# Patient Record
Sex: FEMALE | Race: BLACK OR AFRICAN AMERICAN
[De-identification: names, ages, dates, MRNs, and addresses within clinical notes are randomized per-mention and may not be internally consistent; named-entity substitution may affect disease eponyms.]

---

## 2018-11-12 ENCOUNTER — SURGERY (OUTPATIENT)
Age: 33
End: 2018-11-12

## 2018-11-12 ENCOUNTER — HOSPITAL ENCOUNTER (OUTPATIENT)
Facility: CLINIC | Age: 33
Setting detail: OBSERVATION
Discharge: HOME | End: 2018-11-13
Attending: EMERGENCY MEDICINE | Admitting: SURGERY
Payer: COMMERCIAL

## 2018-11-12 ENCOUNTER — ANESTHESIA EVENT (OUTPATIENT)
Dept: SURGERY | Facility: CLINIC | Age: 33
End: 2018-11-12
Payer: COMMERCIAL

## 2018-11-12 ENCOUNTER — APPOINTMENT (OUTPATIENT)
Dept: ULTRASOUND IMAGING | Facility: CLINIC | Age: 33
End: 2018-11-12
Attending: EMERGENCY MEDICINE
Payer: COMMERCIAL

## 2018-11-12 ENCOUNTER — ANESTHESIA (OUTPATIENT)
Dept: SURGERY | Facility: CLINIC | Age: 33
End: 2018-11-12
Payer: COMMERCIAL

## 2018-11-12 ENCOUNTER — APPOINTMENT (OUTPATIENT)
Dept: CT IMAGING | Facility: CLINIC | Age: 33
End: 2018-11-12
Attending: EMERGENCY MEDICINE
Payer: COMMERCIAL

## 2018-11-12 DIAGNOSIS — K35.80 ACUTE APPENDICITIS, UNSPECIFIED ACUTE APPENDICITIS TYPE: ICD-10-CM

## 2018-11-12 DIAGNOSIS — G89.18 ACUTE POST-OPERATIVE PAIN: Primary | ICD-10-CM

## 2018-11-12 LAB
ALBUMIN SERPL-MCNC: 4 G/DL (ref 3.4–5)
ALP SERPL-CCNC: 70 U/L (ref 40–150)
ALT SERPL W P-5'-P-CCNC: 20 U/L (ref 0–50)
ANION GAP SERPL CALCULATED.3IONS-SCNC: 8 MMOL/L (ref 3–14)
AST SERPL W P-5'-P-CCNC: 20 U/L (ref 0–45)
BASOPHILS # BLD AUTO: 0 10E9/L (ref 0–0.2)
BASOPHILS NFR BLD AUTO: 0.1 %
BILIRUB SERPL-MCNC: 0.6 MG/DL (ref 0.2–1.3)
BUN SERPL-MCNC: 9 MG/DL (ref 7–30)
CALCIUM SERPL-MCNC: 9.2 MG/DL (ref 8.5–10.1)
CHLORIDE SERPL-SCNC: 105 MMOL/L (ref 94–109)
CO2 SERPL-SCNC: 27 MMOL/L (ref 20–32)
CREAT SERPL-MCNC: 0.67 MG/DL (ref 0.52–1.04)
DIFFERENTIAL METHOD BLD: NORMAL
EOSINOPHIL # BLD AUTO: 0.1 10E9/L (ref 0–0.7)
EOSINOPHIL NFR BLD AUTO: 1 %
ERYTHROCYTE [DISTWIDTH] IN BLOOD BY AUTOMATED COUNT: 12.2 % (ref 10–15)
GFR SERPL CREATININE-BSD FRML MDRD: >90 ML/MIN/1.7M2
GLUCOSE SERPL-MCNC: 100 MG/DL (ref 70–99)
HCG UR QL: NEGATIVE
HCT VFR BLD AUTO: 41.7 % (ref 35–47)
HGB BLD-MCNC: 14.2 G/DL (ref 11.7–15.7)
IMM GRANULOCYTES # BLD: 0 10E9/L (ref 0–0.4)
IMM GRANULOCYTES NFR BLD: 0.2 %
INTERNAL QC OK POCT: YES
LIPASE SERPL-CCNC: 50 U/L (ref 73–393)
LYMPHOCYTES # BLD AUTO: 2.3 10E9/L (ref 0.8–5.3)
LYMPHOCYTES NFR BLD AUTO: 26.6 %
MCH RBC QN AUTO: 30.7 PG (ref 26.5–33)
MCHC RBC AUTO-ENTMCNC: 34.1 G/DL (ref 31.5–36.5)
MCV RBC AUTO: 90 FL (ref 78–100)
MONOCYTES # BLD AUTO: 0.4 10E9/L (ref 0–1.3)
MONOCYTES NFR BLD AUTO: 4.1 %
NEUTROPHILS # BLD AUTO: 6 10E9/L (ref 1.6–8.3)
NEUTROPHILS NFR BLD AUTO: 68 %
NRBC # BLD AUTO: 0 10*3/UL
NRBC BLD AUTO-RTO: 0 /100
PLATELET # BLD AUTO: 224 10E9/L (ref 150–450)
POTASSIUM SERPL-SCNC: 3.4 MMOL/L (ref 3.4–5.3)
PROT SERPL-MCNC: 8.1 G/DL (ref 6.8–8.8)
RBC # BLD AUTO: 4.63 10E12/L (ref 3.8–5.2)
SODIUM SERPL-SCNC: 140 MMOL/L (ref 133–144)
WBC # BLD AUTO: 8.8 10E9/L (ref 4–11)

## 2018-11-12 PROCEDURE — 40000170 ZZH STATISTIC PRE-PROCEDURE ASSESSMENT II: Performed by: SURGERY

## 2018-11-12 PROCEDURE — 99285 EMERGENCY DEPT VISIT HI MDM: CPT | Mod: 25 | Performed by: EMERGENCY MEDICINE

## 2018-11-12 PROCEDURE — 96366 THER/PROPH/DIAG IV INF ADDON: CPT | Performed by: EMERGENCY MEDICINE

## 2018-11-12 PROCEDURE — 27211024 ZZHC OR SUPPLY OTHER OPNP: Performed by: SURGERY

## 2018-11-12 PROCEDURE — 25000128 H RX IP 250 OP 636

## 2018-11-12 PROCEDURE — 85025 COMPLETE CBC W/AUTO DIFF WBC: CPT | Performed by: EMERGENCY MEDICINE

## 2018-11-12 PROCEDURE — 25000128 H RX IP 250 OP 636: Performed by: EMERGENCY MEDICINE

## 2018-11-12 PROCEDURE — 25000566 ZZH SEVOFLURANE, EA 15 MIN: Performed by: SURGERY

## 2018-11-12 PROCEDURE — 96376 TX/PRO/DX INJ SAME DRUG ADON: CPT | Mod: 59

## 2018-11-12 PROCEDURE — 36000057 ZZH SURGERY LEVEL 3 1ST 30 MIN - UMMC: Performed by: SURGERY

## 2018-11-12 PROCEDURE — 96365 THER/PROPH/DIAG IV INF INIT: CPT | Mod: 59 | Performed by: EMERGENCY MEDICINE

## 2018-11-12 PROCEDURE — 74177 CT ABD & PELVIS W/CONTRAST: CPT

## 2018-11-12 PROCEDURE — 25000128 H RX IP 250 OP 636: Performed by: STUDENT IN AN ORGANIZED HEALTH CARE EDUCATION/TRAINING PROGRAM

## 2018-11-12 PROCEDURE — 25000132 ZZH RX MED GY IP 250 OP 250 PS 637: Performed by: EMERGENCY MEDICINE

## 2018-11-12 PROCEDURE — 25000125 ZZHC RX 250

## 2018-11-12 PROCEDURE — 36000059 ZZH SURGERY LEVEL 3 EA 15 ADDTL MIN UMMC: Performed by: SURGERY

## 2018-11-12 PROCEDURE — 83690 ASSAY OF LIPASE: CPT | Performed by: EMERGENCY MEDICINE

## 2018-11-12 PROCEDURE — 27210794 ZZH OR GENERAL SUPPLY STERILE: Performed by: SURGERY

## 2018-11-12 PROCEDURE — 76705 ECHO EXAM OF ABDOMEN: CPT

## 2018-11-12 PROCEDURE — 80053 COMPREHEN METABOLIC PANEL: CPT | Performed by: EMERGENCY MEDICINE

## 2018-11-12 PROCEDURE — 96375 TX/PRO/DX INJ NEW DRUG ADDON: CPT | Mod: 59

## 2018-11-12 PROCEDURE — 37000008 ZZH ANESTHESIA TECHNICAL FEE, 1ST 30 MIN: Performed by: SURGERY

## 2018-11-12 PROCEDURE — 25000125 ZZHC RX 250: Performed by: EMERGENCY MEDICINE

## 2018-11-12 PROCEDURE — 71000015 ZZH RECOVERY PHASE 1 LEVEL 2 EA ADDTL HR: Performed by: SURGERY

## 2018-11-12 PROCEDURE — G0378 HOSPITAL OBSERVATION PER HR: HCPCS

## 2018-11-12 PROCEDURE — 96361 HYDRATE IV INFUSION ADD-ON: CPT | Mod: 59 | Performed by: EMERGENCY MEDICINE

## 2018-11-12 PROCEDURE — 71000014 ZZH RECOVERY PHASE 1 LEVEL 2 FIRST HR: Performed by: SURGERY

## 2018-11-12 PROCEDURE — 81025 URINE PREGNANCY TEST: CPT | Performed by: EMERGENCY MEDICINE

## 2018-11-12 PROCEDURE — 37000009 ZZH ANESTHESIA TECHNICAL FEE, EACH ADDTL 15 MIN: Performed by: SURGERY

## 2018-11-12 RX ORDER — NALOXONE HYDROCHLORIDE 0.4 MG/ML
.1-.4 INJECTION, SOLUTION INTRAMUSCULAR; INTRAVENOUS; SUBCUTANEOUS
Status: DISCONTINUED | OUTPATIENT
Start: 2018-11-12 | End: 2018-11-13

## 2018-11-12 RX ORDER — CEFAZOLIN SODIUM 2 G/100ML
2 INJECTION, SOLUTION INTRAVENOUS
Status: COMPLETED | OUTPATIENT
Start: 2018-11-12 | End: 2018-11-12

## 2018-11-12 RX ORDER — IOPAMIDOL 755 MG/ML
100 INJECTION, SOLUTION INTRAVASCULAR ONCE
Status: COMPLETED | OUTPATIENT
Start: 2018-11-12 | End: 2018-11-12

## 2018-11-12 RX ORDER — HYDROMORPHONE HYDROCHLORIDE 1 MG/ML
0.5 INJECTION, SOLUTION INTRAMUSCULAR; INTRAVENOUS; SUBCUTANEOUS
Status: COMPLETED | OUTPATIENT
Start: 2018-11-12 | End: 2018-11-12

## 2018-11-12 RX ORDER — ONDANSETRON 2 MG/ML
4 INJECTION INTRAMUSCULAR; INTRAVENOUS EVERY 6 HOURS PRN
Status: DISCONTINUED | OUTPATIENT
Start: 2018-11-12 | End: 2018-11-13

## 2018-11-12 RX ORDER — OXYCODONE HYDROCHLORIDE 5 MG/1
5-10 TABLET ORAL
Status: DISCONTINUED | OUTPATIENT
Start: 2018-11-12 | End: 2018-11-13 | Stop reason: DRUGHIGH

## 2018-11-12 RX ORDER — HYDROMORPHONE HCL/0.9% NACL/PF 0.2MG/0.2
0.2 SYRINGE (ML) INTRAVENOUS
Status: DISCONTINUED | OUTPATIENT
Start: 2018-11-12 | End: 2018-11-13 | Stop reason: DRUGHIGH

## 2018-11-12 RX ORDER — PROPOFOL 10 MG/ML
INJECTION, EMULSION INTRAVENOUS PRN
Status: DISCONTINUED | OUTPATIENT
Start: 2018-11-12 | End: 2018-11-13

## 2018-11-12 RX ORDER — ONDANSETRON 2 MG/ML
4 INJECTION INTRAMUSCULAR; INTRAVENOUS ONCE
Status: COMPLETED | OUTPATIENT
Start: 2018-11-12 | End: 2018-11-12

## 2018-11-12 RX ORDER — HYDROMORPHONE HYDROCHLORIDE 1 MG/ML
0.5 INJECTION, SOLUTION INTRAMUSCULAR; INTRAVENOUS; SUBCUTANEOUS ONCE
Status: COMPLETED | OUTPATIENT
Start: 2018-11-12 | End: 2018-11-12

## 2018-11-12 RX ORDER — LIDOCAINE 40 MG/G
CREAM TOPICAL
Status: DISCONTINUED | OUTPATIENT
Start: 2018-11-12 | End: 2018-11-13 | Stop reason: HOSPADM

## 2018-11-12 RX ORDER — FENTANYL CITRATE 50 UG/ML
INJECTION, SOLUTION INTRAMUSCULAR; INTRAVENOUS PRN
Status: DISCONTINUED | OUTPATIENT
Start: 2018-11-12 | End: 2018-11-13

## 2018-11-12 RX ORDER — ONDANSETRON 4 MG/1
4 TABLET, ORALLY DISINTEGRATING ORAL EVERY 6 HOURS PRN
Status: DISCONTINUED | OUTPATIENT
Start: 2018-11-12 | End: 2018-11-13

## 2018-11-12 RX ORDER — DEXAMETHASONE SODIUM PHOSPHATE 4 MG/ML
INJECTION, SOLUTION INTRA-ARTICULAR; INTRALESIONAL; INTRAMUSCULAR; INTRAVENOUS; SOFT TISSUE PRN
Status: DISCONTINUED | OUTPATIENT
Start: 2018-11-12 | End: 2018-11-13

## 2018-11-12 RX ORDER — CEFAZOLIN SODIUM 1 G/3ML
1 INJECTION, POWDER, FOR SOLUTION INTRAMUSCULAR; INTRAVENOUS SEE ADMIN INSTRUCTIONS
Status: DISCONTINUED | OUTPATIENT
Start: 2018-11-12 | End: 2018-11-12

## 2018-11-12 RX ORDER — ACETAMINOPHEN 325 MG/1
650 TABLET ORAL EVERY 4 HOURS PRN
Status: DISCONTINUED | OUTPATIENT
Start: 2018-11-12 | End: 2018-11-13 | Stop reason: DRUGHIGH

## 2018-11-12 RX ORDER — SODIUM CHLORIDE 9 MG/ML
1000 INJECTION, SOLUTION INTRAVENOUS CONTINUOUS
Status: DISCONTINUED | OUTPATIENT
Start: 2018-11-12 | End: 2018-11-13 | Stop reason: DRUGHIGH

## 2018-11-12 RX ORDER — CEFAZOLIN SODIUM 1 G/3ML
1 INJECTION, POWDER, FOR SOLUTION INTRAMUSCULAR; INTRAVENOUS SEE ADMIN INSTRUCTIONS
Status: DISCONTINUED | OUTPATIENT
Start: 2018-11-12 | End: 2018-11-13 | Stop reason: HOSPADM

## 2018-11-12 RX ORDER — ERTAPENEM 1 G/1
1 INJECTION, POWDER, LYOPHILIZED, FOR SOLUTION INTRAMUSCULAR; INTRAVENOUS ONCE
Status: COMPLETED | OUTPATIENT
Start: 2018-11-12 | End: 2018-11-12

## 2018-11-12 RX ORDER — SODIUM CHLORIDE, SODIUM LACTATE, POTASSIUM CHLORIDE, CALCIUM CHLORIDE 600; 310; 30; 20 MG/100ML; MG/100ML; MG/100ML; MG/100ML
INJECTION, SOLUTION INTRAVENOUS CONTINUOUS
Status: DISCONTINUED | OUTPATIENT
Start: 2018-11-12 | End: 2018-11-13

## 2018-11-12 RX ORDER — CEFAZOLIN SODIUM 2 G/100ML
2 INJECTION, SOLUTION INTRAVENOUS
Status: DISCONTINUED | OUTPATIENT
Start: 2018-11-12 | End: 2018-11-12 | Stop reason: HOSPADM

## 2018-11-12 RX ADMIN — DEXAMETHASONE SODIUM PHOSPHATE 6 MG: 4 INJECTION, SOLUTION INTRA-ARTICULAR; INTRALESIONAL; INTRAMUSCULAR; INTRAVENOUS; SOFT TISSUE at 23:52

## 2018-11-12 RX ADMIN — ONDANSETRON 4 MG: 2 INJECTION INTRAMUSCULAR; INTRAVENOUS at 21:17

## 2018-11-12 RX ADMIN — ROCURONIUM BROMIDE 40 MG: 10 INJECTION INTRAVENOUS at 23:45

## 2018-11-12 RX ADMIN — SODIUM CHLORIDE, POTASSIUM CHLORIDE, SODIUM LACTATE AND CALCIUM CHLORIDE: 600; 310; 30; 20 INJECTION, SOLUTION INTRAVENOUS at 23:19

## 2018-11-12 RX ADMIN — FENTANYL CITRATE 50 MCG: 50 INJECTION, SOLUTION INTRAMUSCULAR; INTRAVENOUS at 23:31

## 2018-11-12 RX ADMIN — SODIUM CHLORIDE 1000 ML: 9 INJECTION, SOLUTION INTRAVENOUS at 12:09

## 2018-11-12 RX ADMIN — SODIUM CHLORIDE 1000 ML: 9 INJECTION, SOLUTION INTRAVENOUS at 14:23

## 2018-11-12 RX ADMIN — PROPOFOL 100 MG: 10 INJECTION, EMULSION INTRAVENOUS at 23:31

## 2018-11-12 RX ADMIN — SODIUM CHLORIDE 59 ML: 9 INJECTION, SOLUTION INTRAVENOUS at 16:27

## 2018-11-12 RX ADMIN — Medication 0.5 MG: at 18:09

## 2018-11-12 RX ADMIN — CEFAZOLIN SODIUM 2 G: 2 INJECTION, SOLUTION INTRAVENOUS at 23:50

## 2018-11-12 RX ADMIN — SODIUM CHLORIDE 1000 ML: 9 INJECTION, SOLUTION INTRAVENOUS at 18:08

## 2018-11-12 RX ADMIN — SODIUM CHLORIDE, POTASSIUM CHLORIDE, SODIUM LACTATE AND CALCIUM CHLORIDE: 600; 310; 30; 20 INJECTION, SOLUTION INTRAVENOUS at 21:10

## 2018-11-12 RX ADMIN — LIDOCAINE HYDROCHLORIDE 100 MG: 20 INJECTION, SOLUTION INFILTRATION; PERINEURAL at 23:31

## 2018-11-12 RX ADMIN — FAMOTIDINE 20 MG: 10 INJECTION, SOLUTION INTRAVENOUS at 12:13

## 2018-11-12 RX ADMIN — Medication 0.2 MG: at 21:11

## 2018-11-12 RX ADMIN — ONDANSETRON 4 MG: 2 INJECTION INTRAMUSCULAR; INTRAVENOUS at 14:24

## 2018-11-12 RX ADMIN — ONDANSETRON 4 MG: 2 INJECTION INTRAMUSCULAR; INTRAVENOUS at 12:09

## 2018-11-12 RX ADMIN — ERTAPENEM SODIUM 1 G: 1 INJECTION, POWDER, LYOPHILIZED, FOR SOLUTION INTRAMUSCULAR; INTRAVENOUS at 17:44

## 2018-11-12 RX ADMIN — HYDROMORPHONE HYDROCHLORIDE 0.5 MG: 1 INJECTION, SOLUTION INTRAMUSCULAR; INTRAVENOUS; SUBCUTANEOUS at 14:20

## 2018-11-12 RX ADMIN — Medication 100 MG: at 23:33

## 2018-11-12 RX ADMIN — MIDAZOLAM 1 MG: 1 INJECTION INTRAMUSCULAR; INTRAVENOUS at 23:22

## 2018-11-12 RX ADMIN — IOPAMIDOL 78 ML: 755 INJECTION, SOLUTION INTRAVENOUS at 16:26

## 2018-11-12 RX ADMIN — LIDOCAINE HYDROCHLORIDE 30 ML: 20 SOLUTION ORAL; TOPICAL at 12:19

## 2018-11-12 ASSESSMENT — ENCOUNTER SYMPTOMS
FEVER: 0
ABDOMINAL PAIN: 1
VOMITING: 1
COUGH: 0
DIARRHEA: 0
SHORTNESS OF BREATH: 0
DYSURIA: 1

## 2018-11-12 NOTE — IP AVS SNAPSHOT
[address] [address]    Phone:  [phone]                                       After Visit Summary   11/12/2018    Poppy Bustillo    MRN: [id number]           After Visit Summary Signature Page     I have received my discharge instructions, and my questions have been answered. I have discussed any challenges I see with this plan with the nurse or doctor.    ..........................................................................................................................................  Patient/Patient Representative Signature      ..........................................................................................................................................  Patient Representative Print Name and Relationship to Patient    ..................................................               ................................................  Date                                   Time    ..........................................................................................................................................  Reviewed by Signature/Title    ...................................................              ..............................................  Date                                               Time          22EPIC Rev 08/18

## 2018-11-12 NOTE — ED PROVIDER NOTES
History     Chief Complaint   Patient presents with     Abdominal Pain     epigastric pain since yesterday, emesis x 2 today     The history is provided by the patient.     Poppy Bustillo is an otherwise healthy 33 year old female who presents to the Emergency Department due to epigastric pain. Patient reports that she began having epigastric pain yesterday (11/11). She notes that the pain has been constant since onset and states that it kept her from sleeping. She describes the pain as squeezing and reports that she had similar symptoms 2-3 months ago which resolved on its own. She notes that the pain worsens when she lies flat or has something to eat. This morning, patient had four episodes of non-bloody emesis. She denies fevers, cough, chest pain or shortness of breath. Her last normal bowel movement was this morning and she denies diarrhea or bloody stools; no urinary symptoms. Patient denies the use of alcohol and she does not smoke. She took an over-the-counter medication for bloating for symptom management without relief. Denies regular NSAID use.  LMP was on November 4. She denies previous abdominal surgeries.      Current Facility-Administered Medications   Medication     sodium chloride 0.9% infusion     No current outpatient prescriptions on file.     Past Medical History:   Diagnosis Date     NO ACTIVE PROBLEMS        History reviewed. No pertinent surgical history.    No family history on file.    Social History   Substance Use Topics     Smoking status: Never Smoker     Smokeless tobacco: Never Used     Alcohol use No     No Known Allergies    I have reviewed the Medications, Allergies, Past Medical and Surgical History, and Social History in the Epic system.    Review of Systems   Constitutional: Negative for fever.   Respiratory: Negative for cough and shortness of breath.    Cardiovascular: Negative for chest pain.   Gastrointestinal: Positive for abdominal pain (epigastric pain) and vomiting  (non-bloody). Negative for diarrhea.   Genitourinary: Positive for dysuria.   All other systems reviewed and are negative.      Physical Exam   BP: 125/82  Pulse: 85  Temp: 98.1  F (36.7  C)  Resp: 16  Weight: 72.3 kg (159 lb 8 oz)  SpO2: 99 %      Physical Exam   General: patient is alert and oriented, uncomfortable appearing  Head: atraumatic and normocephalic   EENT: moist mucus membranes, pupils round and reactive, sclera anicteric  Neck: supple   Cardiovascular: regular rate and rhythm, extremities warm and well perfused, no lower extremity edema  Pulmonary: lungs clear to auscultation bilaterally   Abdomen: soft, diffusely TTP without guarding, most significant in the epigastrum and LUQ, no CVA TTP   Musculoskeletal: normal range of motion   Neurological: alert and oriented, moving all extremities symmetrically, gait normal   Skin: warm, dry       ED Course   11:42 AM  The patient was seen and examined by Elaine Merida MD in Room 6.   ED Course     Procedures             Critical Care time:  none             Labs Ordered and Resulted from Time of ED Arrival Up to the Time of Departure from the ED - No data to display         Assessments & Plan (with Medical Decision Making)   Patient is a 33-year-old female who presents with epigastric pain. On chart review, she has multiple recurrent episodes of epigastric pain and is currently scheduled for an EGD on Wednesday. She reports that her pain became worse this morning and resulted in multiple episodes of emesis. She is hemodynamically stable and afebrile. She does have tenderness to palpation in the epigastrium and also diffusely in the abdomen. Differential diagnosis includes but is not limited to biliary colic, gastritis, peptic ulcer disease, pancreatitis, hepatitis, appendicitis. She reports she is having regular bowel movements and had one this morning. Low suspicion for bowel obstruction. She denies any melena, hematochezia or hematemesis to suggest a GI  bleed. She has previously tested negative for H. pylori. Labs were obtained including CBC, CMP, lipase and urine pregnancy which are WNL.  Ultrasound of the right upper quadrant is unremarkable. She was given IV fluids, Zofran, famotidine and a GI cocktail. On reassessment, she reports worsening pain with ongoing vomiting.  Patient given additional zofran and dose of hydromorphone.  CT abdomen ordered and pending at sign out.  If CT negative and patient tolerating PO, anticipate discharge to home with plan to follow-up this week for outpatient EGD.      I have reviewed the nursing notes.    I have reviewed the findings, diagnosis, plan and need for follow up with the patient.    New Prescriptions    No medications on file       Final diagnoses:   Abdominal pain, generalized   I, Yelena Silveira, am serving as a trained medical scribe to document services personally performed by Elaine Merida MD, based on the provider's statements to me.   IElaine MD, was physically present and have reviewed and verified the accuracy of this note documented by Yelena Silveira.    11/12/2018   [facility], [place], EMERGENCY DEPARTMENT     Elaine Merida MD  11/12/18 4007

## 2018-11-12 NOTE — DISCHARGE INSTRUCTIONS
Please make an appointment to follow up with Your Primary Care Provider in 3-5 days and follow-up for outpatient EGD as scheduled.  Take ranitidine 150 mg twice a day to help with symptoms.  If you have worsening pain, intractable vomiting, fevers, or other concerns, return to the emergency department for re-evaluation.        Abdominal Pain    Abdominal pain is pain in the stomach or belly area. Everyone has this pain from time to time. In many cases it goes away on its own. But abdominal pain can sometimes be due to a serious problem, such as appendicitis. So it s important to know when to seek help.  Causes of abdominal pain  There are many possible causes of abdominal pain. Common causes in adults include:    Constipation, diarrhea, or gas    Stomach acid flowing back up into the esophagus (acid reflux or heartburn)    Severe acid reflux, called GERD (gastroesophageal reflux disease)    A sore in the lining of the stomach or small intestine (peptic ulcer)    Inflammation of the gallbladder, liver, or pancreas    Gallstones or kidney stones    Appendicitis     Intestinal blockage     An internal organ pushing through a muscle or other tissue (hernia)    Urinary tract infections    In women, menstrual cramps, fibroids, or endometriosis    Inflammation or infection of the intestines  Diagnosing the cause of abdominal pain  Your healthcare provider will do a physical exam help find the cause of your pain. If needed, tests will be ordered. Belly pain has many possible causes. So it can be hard to find the reason for your pain. Giving details about your pain can help. Tell your provider where and when you feel the pain, and what makes it better or worse. Also let your provider know if you have other symptoms such as:    Fever    Tiredness    Upset stomach (nausea)    Vomiting    Changes in bathroom habits  Treating abdominal pain  Some causes of pain need emergency medical treatment right away. These include  appendicitis or a bowel blockage. Other problems can be treated with rest, fluids, or medicines. Your healthcare provider can give you specific instructions for treatment or self-care based on what is causing your pain.  If you have vomiting or diarrhea, sip water or other clear fluids. When you are ready to eat solid foods again, start with small amounts of easy-to-digest, low-fat foods. These include apple sauce, toast, or crackers.   When to seek medical care  Call 911 or go to the hospital right away if you:    Can t pass stool and are vomiting    Are vomiting blood or have bloody diarrhea or black, tarry diarrhea    Have chest, neck, or shoulder pain    Feel like you might pass out    Have pain in your shoulder blades with nausea    Have sudden, severe belly pain    Have new, severe pain unlike any you have felt before    Have a belly that is rigid, hard, and tender to touch  Call your healthcare provider if you have:    Pain for more than 5 days    Bloating for more than 2 days    Diarrhea for more than 5 days    A fever of 100.4 F (38 C) or higher, or as directed by your healthcare provider    Pain that gets worse    Weight loss for no reason    Continued lack of appetite    Blood in your stool  How to prevent abdominal pain  Here are some tips to help prevent abdominal pain:    Eat smaller amounts of food at one time.    Avoid greasy, fried, or other high-fat foods.    Avoid foods that give you gas.    Exercise regularly.    Drink plenty of fluids.  To help prevent GERD symptoms:    Quit smoking.    Reduce alcohol and certain foods that increase stomach acid.    Avoid aspirin and over-the-counter pain and fever medicines (NSAIDS or nonsteroidal anti-inflammatory drugs), if possible    Lose extra weight.    Finish eating at least 2 hours before you go to bed or lie down.    Raise the head of your bed.  Date Last Reviewed: 7/1/2016 2000-2018 [employer]. [address] [facility], [place]  [address]. All rights reserved. This information is not intended as a substitute for professional medical care. Always follow your healthcare professional's instructions.

## 2018-11-12 NOTE — ED NOTES
Patient received as sign out at change of shift pending abdominal CT.  The patient's CT scan reveals an acute appendicitis.  Patient was reexamined at 1650 hrs.  She continues to complain of upper abdominal pain.  She does have some tenderness to palpation in the right lower quadrant of her abdomen but no peritoneal signs at this time.  Ertapenem 1 g IV was ordered.  The patient was discussed with Dr. Silverman of general surgery.  A bed was requested at the [facility].  The patient will be admitted for further management of acute appendicitis.    Results for orders placed or performed during the hospital encounter of 11/12/18 (from the past 24 hour(s))   CBC with platelets differential   Result Value Ref Range    WBC 8.8 4.0 - 11.0 10e9/L    RBC Count 4.63 3.8 - 5.2 10e12/L    Hemoglobin 14.2 11.7 - 15.7 g/dL    Hematocrit 41.7 35.0 - 47.0 %    MCV 90 78 - 100 fl    MCH 30.7 26.5 - 33.0 pg    MCHC 34.1 31.5 - 36.5 g/dL    RDW 12.2 10.0 - 15.0 %    Platelet Count 224 150 - 450 10e9/L    Diff Method Automated Method     % Neutrophils 68.0 %    % Lymphocytes 26.6 %    % Monocytes 4.1 %    % Eosinophils 1.0 %    % Basophils 0.1 %    % Immature Granulocytes 0.2 %    Nucleated RBCs 0 0 /100    Absolute Neutrophil 6.0 1.6 - 8.3 10e9/L    Absolute Lymphocytes 2.3 0.8 - 5.3 10e9/L    Absolute Monocytes 0.4 0.0 - 1.3 10e9/L    Absolute Eosinophils 0.1 0.0 - 0.7 10e9/L    Absolute Basophils 0.0 0.0 - 0.2 10e9/L    Abs Immature Granulocytes 0.0 0 - 0.4 10e9/L    Absolute Nucleated RBC 0.0    Comprehensive metabolic panel   Result Value Ref Range    Sodium 140 133 - 144 mmol/L    Potassium 3.4 3.4 - 5.3 mmol/L    Chloride 105 94 - 109 mmol/L    Carbon Dioxide 27 20 - 32 mmol/L    Anion Gap 8 3 - 14 mmol/L    Glucose 100 (H) 70 - 99 mg/dL    Urea Nitrogen 9 7 - 30 mg/dL    Creatinine 0.67 0.52 - 1.04 mg/dL    GFR Estimate >90 >60 mL/min/1.7m2    GFR Estimate If Black >90 >60 mL/min/1.7m2    Calcium 9.2 8.5 - 10.1 mg/dL     Bilirubin Total 0.6 0.2 - 1.3 mg/dL    Albumin 4.0 3.4 - 5.0 g/dL    Protein Total 8.1 6.8 - 8.8 g/dL    Alkaline Phosphatase 70 40 - 150 U/L    ALT 20 0 - 50 U/L    AST 20 0 - 45 U/L   Lipase   Result Value Ref Range    Lipase 50 (L) 73 - 393 U/L   US Abdomen Limited (RUQ)    Narrative    RIGHT UPPER QUADRANT ULTRASOUND    PROCEDURE DATE:  11/12/2018 1:34 PM     CLINICAL HISTORY/INDICATION:   epigastric pain and vomiting;     COMPARISON:   None.    TECHNIQUE:   Grayscale and color doppler were performed of the right upper  quadrant.    FINDINGS:   Gallbladder:  Normal with no cholelithiasis, wall thickening or focal  tenderness.      Bile ducts:   CHD is normal diameter.  No intrahepatic biliary  dilatation.    Liver:   Normal.     Pancreas:  Unremarkable head and body, the tail is partially obscured  by bowel gas.    Right kidney:  Measures 9.5 cm. Cortical echogenicity is normal without evidence for  shadowing stone or mass. No renal cysts. No hydronephrosis.       Impression    IMPRESSION:   Unremarkable right upper quadrant ultrasound    KWABENA GERARDO MD   hCG qual urine POCT   Result Value Ref Range    HCG Qual Urine Negative neg    Internal QC OK Yes    CT Abdomen Pelvis w Contrast    Narrative    CT ABDOMEN AND PELVIS WITH CONTRAST   11/12/2018 4:28 PM     HISTORY: Abdominal pain, intractable with vomiting.    TECHNIQUE: 78mL of isovue 370. CT images of the abdomen and pelvis  following nonionic intravenous contrast. Radiation dose for this scan  was reduced using automated exposure control, adjustment of the mA  and/or kV according to patient size, or iterative reconstruction  technique.    COMPARISON: None.    FINDINGS:   The appendix is abnormally dilated at 1.6 cm and surrounded by fatty  infiltration. There is a large appendicolith. There is some  fecalization of the distal small bowel contents but no evidence of  abscess. There is trace free fluid in the pelvis.    The liver, gallbladder, spleen,  pancreas, adrenal glands, and kidneys  appear normal. No free intraperitoneal air. No bowel obstruction. No  abdominal or retroperitoneal lymphadenopathy. No pelvic adenopathy.  Visualized bones are unremarkable.      Impression    IMPRESSION: Acute appendicitis.    Results discussed with Dr. Gongora at 4:38 PM on 11/12/2018.    MD Rolan SOUSA Paul, MD  11/12/18 1633

## 2018-11-12 NOTE — LETTER
[address] [facility]  [address] [facility] [address]  Phone: [phone]    November 13, 2018        Poppy Bustillo  [address]   [facility] [address]          To whom it may concern:    RE: Poppy Bustillo was admitted to the hospital on 11/12/2018 and discharged on 11/13/2018. Please excuse her from any work obligations she may have missed at that time. Patient should not return to work until 11/19/18 for her recovery. At that time, she should remain on light duty for 2 weeks and should not lift more than 15 pounds.    Please contact me for questions or concerns.      Sincerely,        Jaxson Jordan MD  General Surgery   [phone]

## 2018-11-12 NOTE — IP AVS SNAPSHOT
MRN:[id number]                      After Visit Summary   11/12/2018    Poppy Bustillo    MRN: [id number]           Thank you!     Thank you for choosing [facility] for your care. Our goal is always to provide you with excellent care. Hearing back from our patients is one way we can continue to improve our services. Please take a few minutes to complete the written survey that you may receive in the mail after you visit with us. Thank you!        Patient Information     Date Of Birth          1985        About your hospital stay     You were admitted on:  November 12, 2018 You last received care in the:  [facility]    You were discharged on:  November 13, 2018        Reason for your hospital stay       Acute appendicitis                  Who to Call     For medical emergencies, please call 911.  For non-urgent questions about your medical care, please call your primary care provider or clinic, None  For questions related to your surgery, please call your surgery clinic        Attending Provider     Provider Specialty    Elaine Merida MD Emergency Medicine Emergency Medical Services    Wilfredo Gongora MD Emergency Medicine    Herrera, Lynnette Rios MD General Surgery       Primary Care Provider Fax #    Physician No Ref-Primary [phone]      After Care Instructions     Activity       Your activity upon discharge: no heavy lifting for 2-3 weeks, do not drive while taking narcotic pain medications, activity as tolerated.            Diet       Follow this diet upon discharge: Regular            Discharge Instructions       May start general diet immediately.    Do not lift more than 20 pounds for 4 weeks after surgery.     You may shower after surgery but do not scrub incisions; simply let soapy water run over them. Pat area dry.     If you have steri-strips or glue over the incisions, these items will fall off on their own and do not need to be replaced.    Do not  soak in a bath tub or pool for 4 weeks after surgery.    No driving, no using heavy machinery and no major decision-making while taking narcotic pain medication. It is illegal to drive while taking narcotic pain medication. Narcotics can cause constipation. Take stool softener while taking narcotic pain medication. If no bowel movement for 2 days add a laxative to aid in bowel movement. You can obtain a laxative from your pharmacyst over the counter.     You may take Tylenol (acetaminophen) and/or Motrin (ibuprofen) in addition or instead of narcotic medication; it is helpful to take these medications as you wean down off of the narcotic medications. If you have been prescribed Percocet, be aware that it contains Tylenol and oxycodone together. Do not take a total of more than 3500 mg of Tylenol per 24 hours to avoid liver damage.    Please call if you experience increasing abdominal pain, nausea, vomiting, increasing drainage from your wounds, chills, or fever >101.5.    If you have questions about your follow-up appointment, please call the General Surgery Clinic at [phone].  Call [phone] and ask to speak with surgery resident if you are having troubles in the evenings, at night, or on weekends.                  Further instructions from your care team       Please make an appointment to follow up with Your Primary Care Provider in 3-5 days and follow-up for outpatient EGD as scheduled.  Take ranitidine 150 mg twice a day to help with symptoms.  If you have worsening pain, intractable vomiting, fevers, or other concerns, return to the emergency department for re-evaluation.        Abdominal Pain    Abdominal pain is pain in the stomach or belly area. Everyone has this pain from time to time. In many cases it goes away on its own. But abdominal pain can sometimes be due to a serious problem, such as appendicitis. So it s important to know when to seek help.  Causes of abdominal pain  There are many  possible causes of abdominal pain. Common causes in adults include:    Constipation, diarrhea, or gas    Stomach acid flowing back up into the esophagus (acid reflux or heartburn)    Severe acid reflux, called GERD (gastroesophageal reflux disease)    A sore in the lining of the stomach or small intestine (peptic ulcer)    Inflammation of the gallbladder, liver, or pancreas    Gallstones or kidney stones    Appendicitis     Intestinal blockage     An internal organ pushing through a muscle or other tissue (hernia)    Urinary tract infections    In women, menstrual cramps, fibroids, or endometriosis    Inflammation or infection of the intestines  Diagnosing the cause of abdominal pain  Your healthcare provider will do a physical exam help find the cause of your pain. If needed, tests will be ordered. Belly pain has many possible causes. So it can be hard to find the reason for your pain. Giving details about your pain can help. Tell your provider where and when you feel the pain, and what makes it better or worse. Also let your provider know if you have other symptoms such as:    Fever    Tiredness    Upset stomach (nausea)    Vomiting    Changes in bathroom habits  Treating abdominal pain  Some causes of pain need emergency medical treatment right away. These include appendicitis or a bowel blockage. Other problems can be treated with rest, fluids, or medicines. Your healthcare provider can give you specific instructions for treatment or self-care based on what is causing your pain.  If you have vomiting or diarrhea, sip water or other clear fluids. When you are ready to eat solid foods again, start with small amounts of easy-to-digest, low-fat foods. These include apple sauce, toast, or crackers.   When to seek medical care  Call 911 or go to the hospital right away if you:    Can t pass stool and are vomiting    Are vomiting blood or have bloody diarrhea or black, tarry diarrhea    Have chest, neck, or shoulder  pain    Feel like you might pass out    Have pain in your shoulder blades with nausea    Have sudden, severe belly pain    Have new, severe pain unlike any you have felt before    Have a belly that is rigid, hard, and tender to touch  Call your healthcare provider if you have:    Pain for more than 5 days    Bloating for more than 2 days    Diarrhea for more than 5 days    A fever of 100.4 F (38 C) or higher, or as directed by your healthcare provider    Pain that gets worse    Weight loss for no reason    Continued lack of appetite    Blood in your stool  How to prevent abdominal pain  Here are some tips to help prevent abdominal pain:    Eat smaller amounts of food at one time.    Avoid greasy, fried, or other high-fat foods.    Avoid foods that give you gas.    Exercise regularly.    Drink plenty of fluids.  To help prevent GERD symptoms:    Quit smoking.    Reduce alcohol and certain foods that increase stomach acid.    Avoid aspirin and over-the-counter pain and fever medicines (NSAIDS or nonsteroidal anti-inflammatory drugs), if possible    Lose extra weight.    Finish eating at least 2 hours before you go to bed or lie down.    Raise the head of your bed.  Date Last Reviewed: 7/1/2016 2000-2018 [employer]. [address] [place] [address]. All rights reserved. This information is not intended as a substitute for professional medical care. Always follow your healthcare professional's instructions.            Additional Information     If you use hormonal birth control (such as the pill, patch, ring or implants): You'll need a second form of birth control for 7 days (condoms, a diaphragm or contraceptive foam). While in the hospital, you received a medicine called Bridion. Your normal birth control will not work as well for a week after taking this medicine.          Pending Results     Date and Time Order Name Status Description    11/13/2018 0048 Surgical pathology exam In process   "           Statement of Approval     Ordered          18 1023  I have reviewed and agree with all the recommendations and orders detailed in this document.  EFFECTIVE NOW     Approved and electronically signed by:  Jaxson Jordan MD             Admission Information     Date & Time Provider Department Dept. Phone    2018 Lynnette Silverman MD [address] [facility] [place] [phone]      Your Vitals Were     Blood Pressure Pulse Temperature Respirations Weight Last Period    101/46 (BP Location: Right arm) 85 98.2  F (36.8  C) (Oral) 16 72.3 kg (159 lb 8 oz) 2018    Pulse Oximetry                   97%           MyChart Information     [employer] lets you send messages to your doctor, view your test results, renew your prescriptions, schedule appointments and more. To sign up, go to [email or web address] . Click on \"Log in\" on the left side of the screen, which will take you to the Welcome page. Then click on \"Sign up Now\" on the right side of the page.     You will be asked to enter the access code listed below, as well as some personal information. Please follow the directions to create your username and password.     Your access code is: [email or web address]  Expires: 2019 11:10 AM     Your access code will  in 90 days. If you need help or a new code, please call your [facility] clinic or [phone].        Care EveryWhere ID     This is your Care EveryWhere ID. This could be used by other organizations to access your [facility] medical records  [id number]        Equal Access to Services     [facility]: Hadii robert umanzoro Sojaxon, waaxda luqadaha, qaybta kaalmada mat mark . So [facility] [phone].    ATENCIÓN: Si habla español, tiene a villalta disposición servicios gratuitos de asistencia lingüística. Llame al [phone].    We comply with applicable federal civil rights laws and [place] laws. We do not discriminate on the basis of race, " color, national origin, age, disability, sex, sexual orientation, or gender identity.               Review of your medicines      START taking        Dose / Directions    acetaminophen 325 MG tablet   Commonly known as:  TYLENOL   Used for:  Acute post-operative pain        Dose:  650 mg   Take 2 tablets (650 mg) by mouth every 6 hours as needed for mild pain   Quantity:  100 tablet   Refills:  0       oxyCODONE IR 5 MG tablet   Commonly known as:  ROXICODONE        Dose:  5-10 mg   Take 1-2 tablets (5-10 mg) by mouth every 6 hours as needed (Moderate to Severe Pain)   Quantity:  16 tablet   Refills:  0            Where to get your medicines      These medications were sent to [facility] [place], [place] - [address]  [address] [address]     Phone:  [phone]     acetaminophen 325 MG tablet         Some of these will need a paper prescription and others can be bought over the counter. Ask your nurse if you have questions.     Bring a paper prescription for each of these medications     oxyCODONE IR 5 MG tablet                Protect others around you: Learn how to safely use, store and throw away your medicines at www.disposemymeds.org.        Information about OPIOIDS     PRESCRIPTION OPIOIDS: WHAT YOU NEED TO KNOW   We gave you an opioid (narcotic) pain medicine. It is important to manage your pain, but opioids are not always the best choice. You should first try all the other options your care team gave you. Take this medicine for as short a time (and as few doses) as possible.    Some activities can increase your pain, such as bandage changes or therapy sessions. It may help to take your pain medicine 30 to 60 minutes before these activities. Reduce your stress by getting enough sleep, working on hobbies you enjoy and practicing relaxation or meditation. Talk to your care team about ways to manage your pain beyond prescription opioids.    These medicines have  risks:    DO NOT drive when on new or higher doses of pain medicine. These medicines can affect your alertness and reaction times, and you could be arrested for driving under the influence (DUI). If you need to use opioids long-term, talk to your care team about driving.    DO NOT operate heavy machinery    DO NOT do any other dangerous activities while taking these medicines.    DO NOT drink any alcohol while taking these medicines.     If the opioid prescribed includes acetaminophen, DO NOT take with any other medicines that contain acetaminophen. Read all labels carefully. Look for the word  acetaminophen  or  Tylenol.  Ask your pharmacist if you have questions or are unsure.    You can get addicted to pain medicines, especially if you have a history of addiction (chemical, alcohol or substance dependence). Talk to your care team about ways to reduce this risk.    All opioids tend to cause constipation. Drink plenty of water and eat foods that have a lot of fiber, such as fruits, vegetables, prune juice, apple juice and high-fiber cereal. Take a laxative (Miralax, milk of magnesia, Colace, Senna) if you don t move your bowels at least every other day. Other side effects include upset stomach, sleepiness, dizziness, throwing up, tolerance (needing more of the medicine to have the same effect), physical dependence and slowed breathing.    Store your pills in a secure place, locked if possible. We will not replace any lost or stolen medicine. If you don t finish your medicine, please throw away (dispose) as directed by your pharmacist. The [place] Pollution Control Agency has more information about safe disposal: https://www.pca.[facility].[place].us/living-green/managing-unwanted-medications             Medication List: This is a list of all your medications and when to take them. Check marks below indicate your daily home schedule. Keep this list as a reference.      Medications           Morning Afternoon Evening Bedtime  As Needed    acetaminophen 325 MG tablet   Commonly known as:  TYLENOL   Take 2 tablets (650 mg) by mouth every 6 hours as needed for mild pain   Last time this was given:  325 mg on 11/13/2018  3:01 PM                                oxyCODONE IR 5 MG tablet   Commonly known as:  ROXICODONE   Take 1-2 tablets (5-10 mg) by mouth every 6 hours as needed (Moderate to Severe Pain)   Last time this was given:  5 mg on 11/13/2018  4:25 PM

## 2018-11-13 VITALS
WEIGHT: 159.5 LBS | DIASTOLIC BLOOD PRESSURE: 46 MMHG | TEMPERATURE: 98.2 F | RESPIRATION RATE: 16 BRPM | HEART RATE: 85 BPM | SYSTOLIC BLOOD PRESSURE: 101 MMHG | OXYGEN SATURATION: 97 %

## 2018-11-13 LAB
ANION GAP SERPL CALCULATED.3IONS-SCNC: 8 MMOL/L (ref 3–14)
BUN SERPL-MCNC: 5 MG/DL (ref 7–30)
CALCIUM SERPL-MCNC: 8.6 MG/DL (ref 8.5–10.1)
CHLORIDE SERPL-SCNC: 106 MMOL/L (ref 94–109)
CO2 SERPL-SCNC: 23 MMOL/L (ref 20–32)
CREAT SERPL-MCNC: 0.56 MG/DL (ref 0.52–1.04)
ERYTHROCYTE [DISTWIDTH] IN BLOOD BY AUTOMATED COUNT: 12.6 % (ref 10–15)
GFR SERPL CREATININE-BSD FRML MDRD: >90 ML/MIN/1.7M2
GLUCOSE SERPL-MCNC: 140 MG/DL (ref 70–99)
HCT VFR BLD AUTO: 37.8 % (ref 35–47)
HGB BLD-MCNC: 12.8 G/DL (ref 11.7–15.7)
MCH RBC QN AUTO: 31 PG (ref 26.5–33)
MCHC RBC AUTO-ENTMCNC: 33.9 G/DL (ref 31.5–36.5)
MCV RBC AUTO: 92 FL (ref 78–100)
PLATELET # BLD AUTO: 215 10E9/L (ref 150–450)
POTASSIUM SERPL-SCNC: 3.5 MMOL/L (ref 3.4–5.3)
RBC # BLD AUTO: 4.13 10E12/L (ref 3.8–5.2)
SODIUM SERPL-SCNC: 138 MMOL/L (ref 133–144)
WBC # BLD AUTO: 11.7 10E9/L (ref 4–11)

## 2018-11-13 PROCEDURE — 25000128 H RX IP 250 OP 636: Performed by: ANESTHESIOLOGY

## 2018-11-13 PROCEDURE — 85027 COMPLETE CBC AUTOMATED: CPT | Performed by: STUDENT IN AN ORGANIZED HEALTH CARE EDUCATION/TRAINING PROGRAM

## 2018-11-13 PROCEDURE — 36415 COLL VENOUS BLD VENIPUNCTURE: CPT | Performed by: STUDENT IN AN ORGANIZED HEALTH CARE EDUCATION/TRAINING PROGRAM

## 2018-11-13 PROCEDURE — 88304 TISSUE EXAM BY PATHOLOGIST: CPT | Performed by: SURGERY

## 2018-11-13 PROCEDURE — 25000132 ZZH RX MED GY IP 250 OP 250 PS 637: Performed by: STUDENT IN AN ORGANIZED HEALTH CARE EDUCATION/TRAINING PROGRAM

## 2018-11-13 PROCEDURE — 25000128 H RX IP 250 OP 636

## 2018-11-13 PROCEDURE — 25000128 H RX IP 250 OP 636: Performed by: STUDENT IN AN ORGANIZED HEALTH CARE EDUCATION/TRAINING PROGRAM

## 2018-11-13 PROCEDURE — 80048 BASIC METABOLIC PNL TOTAL CA: CPT | Performed by: STUDENT IN AN ORGANIZED HEALTH CARE EDUCATION/TRAINING PROGRAM

## 2018-11-13 PROCEDURE — 25000125 ZZHC RX 250

## 2018-11-13 PROCEDURE — 25000125 ZZHC RX 250: Performed by: SURGERY

## 2018-11-13 PROCEDURE — G0378 HOSPITAL OBSERVATION PER HR: HCPCS

## 2018-11-13 RX ORDER — FENTANYL CITRATE 50 UG/ML
25-50 INJECTION, SOLUTION INTRAMUSCULAR; INTRAVENOUS EVERY 5 MIN PRN
Status: DISCONTINUED | OUTPATIENT
Start: 2018-11-13 | End: 2018-11-13 | Stop reason: HOSPADM

## 2018-11-13 RX ORDER — SODIUM CHLORIDE, SODIUM LACTATE, POTASSIUM CHLORIDE, CALCIUM CHLORIDE 600; 310; 30; 20 MG/100ML; MG/100ML; MG/100ML; MG/100ML
INJECTION, SOLUTION INTRAVENOUS CONTINUOUS PRN
Status: DISCONTINUED | OUTPATIENT
Start: 2018-11-12 | End: 2018-11-13

## 2018-11-13 RX ORDER — ACETAMINOPHEN 325 MG/1
650 TABLET ORAL EVERY 6 HOURS PRN
Status: DISCONTINUED | OUTPATIENT
Start: 2018-11-13 | End: 2018-11-13 | Stop reason: HOSPADM

## 2018-11-13 RX ORDER — MEPERIDINE HYDROCHLORIDE 50 MG/ML
12.5 INJECTION INTRAMUSCULAR; INTRAVENOUS; SUBCUTANEOUS
Status: DISCONTINUED | OUTPATIENT
Start: 2018-11-13 | End: 2018-11-13 | Stop reason: HOSPADM

## 2018-11-13 RX ORDER — ACETAMINOPHEN 325 MG/1
650 TABLET ORAL EVERY 6 HOURS PRN
Qty: 100 TABLET | Refills: 0 | Status: SHIPPED | OUTPATIENT
Start: 2018-11-13

## 2018-11-13 RX ORDER — NALOXONE HYDROCHLORIDE 0.4 MG/ML
.1-.4 INJECTION, SOLUTION INTRAMUSCULAR; INTRAVENOUS; SUBCUTANEOUS
Status: DISCONTINUED | OUTPATIENT
Start: 2018-11-13 | End: 2018-11-13 | Stop reason: HOSPADM

## 2018-11-13 RX ORDER — HYDROMORPHONE HCL/0.9% NACL/PF 0.2MG/0.2
0.2 SYRINGE (ML) INTRAVENOUS
Status: DISCONTINUED | OUTPATIENT
Start: 2018-11-13 | End: 2018-11-13 | Stop reason: HOSPADM

## 2018-11-13 RX ORDER — SODIUM CHLORIDE, SODIUM LACTATE, POTASSIUM CHLORIDE, CALCIUM CHLORIDE 600; 310; 30; 20 MG/100ML; MG/100ML; MG/100ML; MG/100ML
INJECTION, SOLUTION INTRAVENOUS CONTINUOUS
Status: DISCONTINUED | OUTPATIENT
Start: 2018-11-13 | End: 2018-11-13

## 2018-11-13 RX ORDER — ONDANSETRON 2 MG/ML
INJECTION INTRAMUSCULAR; INTRAVENOUS PRN
Status: DISCONTINUED | OUTPATIENT
Start: 2018-11-13 | End: 2018-11-13

## 2018-11-13 RX ORDER — SODIUM CHLORIDE, SODIUM LACTATE, POTASSIUM CHLORIDE, CALCIUM CHLORIDE 600; 310; 30; 20 MG/100ML; MG/100ML; MG/100ML; MG/100ML
INJECTION, SOLUTION INTRAVENOUS CONTINUOUS
Status: DISCONTINUED | OUTPATIENT
Start: 2018-11-13 | End: 2018-11-13 | Stop reason: HOSPADM

## 2018-11-13 RX ORDER — LIDOCAINE HYDROCHLORIDE 20 MG/ML
INJECTION, SOLUTION INFILTRATION; PERINEURAL PRN
Status: DISCONTINUED | OUTPATIENT
Start: 2018-11-12 | End: 2018-11-13

## 2018-11-13 RX ORDER — OXYCODONE HYDROCHLORIDE 5 MG/1
5-10 TABLET ORAL EVERY 4 HOURS PRN
Status: DISCONTINUED | OUTPATIENT
Start: 2018-11-13 | End: 2018-11-13 | Stop reason: HOSPADM

## 2018-11-13 RX ORDER — ONDANSETRON 4 MG/1
4 TABLET, ORALLY DISINTEGRATING ORAL EVERY 30 MIN PRN
Status: DISCONTINUED | OUTPATIENT
Start: 2018-11-13 | End: 2018-11-13 | Stop reason: HOSPADM

## 2018-11-13 RX ORDER — ONDANSETRON 2 MG/ML
4 INJECTION INTRAMUSCULAR; INTRAVENOUS EVERY 6 HOURS PRN
Status: DISCONTINUED | OUTPATIENT
Start: 2018-11-13 | End: 2018-11-13 | Stop reason: HOSPADM

## 2018-11-13 RX ORDER — OXYCODONE HYDROCHLORIDE 5 MG/1
5-10 TABLET ORAL EVERY 6 HOURS PRN
Qty: 16 TABLET | Refills: 0 | Status: SHIPPED | OUTPATIENT
Start: 2018-11-13

## 2018-11-13 RX ORDER — HYDROMORPHONE HYDROCHLORIDE 1 MG/ML
.3-.5 INJECTION, SOLUTION INTRAMUSCULAR; INTRAVENOUS; SUBCUTANEOUS EVERY 10 MIN PRN
Status: DISCONTINUED | OUTPATIENT
Start: 2018-11-13 | End: 2018-11-13 | Stop reason: HOSPADM

## 2018-11-13 RX ORDER — ONDANSETRON 4 MG/1
4 TABLET, ORALLY DISINTEGRATING ORAL EVERY 6 HOURS PRN
Status: DISCONTINUED | OUTPATIENT
Start: 2018-11-13 | End: 2018-11-13 | Stop reason: HOSPADM

## 2018-11-13 RX ORDER — LIDOCAINE HYDROCHLORIDE AND EPINEPHRINE 10; 10 MG/ML; UG/ML
INJECTION, SOLUTION INFILTRATION; PERINEURAL PRN
Status: DISCONTINUED | OUTPATIENT
Start: 2018-11-13 | End: 2018-11-13 | Stop reason: HOSPADM

## 2018-11-13 RX ORDER — ONDANSETRON 2 MG/ML
4 INJECTION INTRAMUSCULAR; INTRAVENOUS EVERY 30 MIN PRN
Status: DISCONTINUED | OUTPATIENT
Start: 2018-11-13 | End: 2018-11-13 | Stop reason: HOSPADM

## 2018-11-13 RX ADMIN — OXYCODONE HYDROCHLORIDE 5 MG: 5 TABLET ORAL at 05:27

## 2018-11-13 RX ADMIN — ONDANSETRON 4 MG: 2 INJECTION INTRAMUSCULAR; INTRAVENOUS at 00:40

## 2018-11-13 RX ADMIN — FENTANYL CITRATE 25 MCG: 50 INJECTION INTRAMUSCULAR; INTRAVENOUS at 01:32

## 2018-11-13 RX ADMIN — ACETAMINOPHEN 650 MG: 325 TABLET, FILM COATED ORAL at 06:34

## 2018-11-13 RX ADMIN — LIDOCAINE HYDROCHLORIDE AND EPINEPHRINE 20 ML: 10; 10 INJECTION, SOLUTION INFILTRATION; PERINEURAL at 01:00

## 2018-11-13 RX ADMIN — OXYCODONE HYDROCHLORIDE 5 MG: 5 TABLET ORAL at 10:32

## 2018-11-13 RX ADMIN — Medication 0.2 MG: at 02:56

## 2018-11-13 RX ADMIN — ONDANSETRON 4 MG: 2 INJECTION INTRAMUSCULAR; INTRAVENOUS at 03:09

## 2018-11-13 RX ADMIN — Medication 0.4 MG: at 02:11

## 2018-11-13 RX ADMIN — SODIUM CHLORIDE, POTASSIUM CHLORIDE, SODIUM LACTATE AND CALCIUM CHLORIDE: 600; 310; 30; 20 INJECTION, SOLUTION INTRAVENOUS at 02:56

## 2018-11-13 RX ADMIN — OXYCODONE HYDROCHLORIDE 5 MG: 5 TABLET ORAL at 04:51

## 2018-11-13 RX ADMIN — Medication 0.3 MG: at 02:00

## 2018-11-13 RX ADMIN — OXYCODONE HYDROCHLORIDE 5 MG: 5 TABLET ORAL at 16:25

## 2018-11-13 RX ADMIN — FENTANYL CITRATE 50 MCG: 50 INJECTION INTRAMUSCULAR; INTRAVENOUS at 01:40

## 2018-11-13 RX ADMIN — ACETAMINOPHEN 325 MG: 325 TABLET, FILM COATED ORAL at 15:01

## 2018-11-13 RX ADMIN — SUGAMMADEX 150 MG: 100 INJECTION, SOLUTION INTRAVENOUS at 01:05

## 2018-11-13 RX ADMIN — FENTANYL CITRATE 50 MCG: 50 INJECTION, SOLUTION INTRAMUSCULAR; INTRAVENOUS at 00:07

## 2018-11-13 NOTE — ANESTHESIA PREPROCEDURE EVALUATION
Anesthesia Pre-Procedure Evaluation    Patient: Poppy Bustillo   MRN:     [id number] Gender:   female   Age:    33 year old :      1985        Preoperative Diagnosis: acute appendicitis   Procedure(s):  LAPAROSCOPIC APPENDECTOMY     Past Medical History:   Diagnosis Date     NO ACTIVE PROBLEMS       History reviewed. No pertinent surgical history.       Anesthesia Evaluation     . Pt has not had prior anesthetic            ROS/MED HX    ENT/Pulmonary:  - neg pulmonary ROS     Neurologic:  - neg neurologic ROS     Cardiovascular:  - neg cardiovascular ROS       METS/Exercise Tolerance:     Hematologic:  - neg hematologic  ROS       Musculoskeletal:  - neg musculoskeletal ROS       GI/Hepatic:     (+) appendicitis,       Renal/Genitourinary:  - ROS Renal section negative       Endo:         Psychiatric:  - neg psychiatric ROS       Infectious Disease:  - neg infectious disease ROS       Malignancy:      - no malignancy   Other:    (+) No chance of pregnancy   - neg other ROS                     PHYSICAL EXAM:   Mental Status/Neuro: A/A/O   Airway: Facies: Feasible  Mallampati: Not Assessed  Mouth/Opening: Not Assessed  TM distance: Not Assessed  Neck ROM: Not Assessed   Respiratory: Auscultation: CTAB     Resp. Rate: Normal     Resp. Effort: Normal      CV: Rhythm: Regular  Rate: Age appropriate  Heart: Normal Sounds   Comments:      Dental: Normal                  Lab Results   Component Value Date    WBC 8.8 2018    HGB 14.2 2018    HCT 41.7 2018     2018     2018    POTASSIUM 3.4 2018    CHLORIDE 105 2018    CO2 27 2018    BUN 9 2018    CR 0.67 2018     (H) 2018    JOSE 9.2 2018    ALBUMIN 4.0 2018    PROTTOTAL 8.1 2018    ALT 20 2018    AST 20 2018    ALKPHOS 70 2018    BILITOTAL 0.6 2018    LIPASE 50 (L) 2018    HCG Negative 2018       Preop Vitals  BP Readings from  Last 3 Encounters:   11/12/18 102/59   11/15/16 119/85   10/11/14 114/71    Pulse Readings from Last 3 Encounters:   11/12/18 85   11/15/16 86      Resp Readings from Last 3 Encounters:   11/12/18 16   11/15/16 16   10/11/14 18    SpO2 Readings from Last 3 Encounters:   11/12/18 99%   11/15/16 98%   10/11/14 100%      Temp Readings from Last 1 Encounters:   11/12/18 36.7  C (98.1  F) (Oral)    Ht Readings from Last 1 Encounters:   No data found for Ht      Wt Readings from Last 1 Encounters:   11/12/18 72.3 kg (159 lb 8 oz)    There is no height or weight on file to calculate BMI.     LDA:  Peripheral IV 11/12/18 Left (Active)   Site Assessment WDL 11/12/2018 12:08 PM   Line Status Infusing 11/12/2018 12:08 PM   Number of days:0            Assessment:   ASA SCORE: 2 emergent     NPO Status: Increased aspiration risk; Unknown NPO status   Documentation: Deferred   Proceeding: Proceed without further delay  Tobacco Use:  NO Active use of Tobacco/UNKNOWN Tobacco use status     Plan:   Anes. Type:  General   Pre-Induction: Midazolam IV      Airway: Oral ETT   Access/Monitoring: PIV   Maintenance: Balanced   Emergence: Procedure Site   Logistics: Observation/Admission     Postop Pain/Sedation Strategy:  Standard-Options: Opioids PRN     PONV Management:  Adult Risk Factors: Female, Non-Smoker, Postop Opioids  Prevention: Ondansetron; Dexamethasone     CONSENT: Direct conversation   Plan and risks discussed with: Patient; Power of    Blood Products: Consent Deferred (Minimal Blood Loss)                         Yvonne Horvath MD

## 2018-11-13 NOTE — OP NOTE
[facility], [place]    Operative Note    Pre-operative diagnosis: acute appendicitis   Post-operative diagnosis Acute appendicitis   Procedure: Procedure(s):  LAPAROSCOPIC APPENDECTOMY   Surgeon: Surgeon(s) and Role:     * Lynnette Silverman MD - Primary     * Robin Lazo MD - Resident - Assisting     * Dwayne Hastings MD - Resident - Assisting   Anesthesia: General    Estimated blood loss: 5 mL   Drains: None   Specimens:   ID Type Source Tests Collected by Time Destination   A : Appendix  Tissue Appendix SURGICAL PATHOLOGY EXAM Lynnette Silverman MD 11/13/2018 12:48 AM       Findings: inflamed, edematous and indurated appendix, nonperforated.   Complications: None.   Implants: None.         OPERATIVE INDICATIONS:  Poppy Bustillo is a 33 year old-year-old female with a history of right lower quadrant abdominal pain.  The pain started 36 hours previously and we were asked to evaluate the abdominal pain.    CT scan of the abdomen and pelvis showed acute appendicitis.    After understanding the risks and benefits of proceeding with surgery, the patient has an indication for laparoscopic appendectomy and consented to undergo surgery.      OPERATIVE DETAILS:  The patient was brought to the operating room and prepared in a routine fashion.  A timeout was performed prior to surgery and documented by the nursing team.    Under the benefits of general anesthesia, a supraumbilical incision was made and the abdomen was entered under direct supervision. Pneumoperitoneum was established using carbon dioxide gas to a maximum pressure of 15 mmHg.  A total of 3 ports were placed and the 5 mm laparoscope was utilized.    The patient was moved into a position with the right side up.    The cecum was identified and the appendix was identified at the confluence of the tenia coli.    The appendix was elevated.  Lysis of adhesions was performed to mobilize the appendix.    The  mesoappendix was divided via the Ligasure device and the appendix was then divided at its base using a blue load EndoGIA stapler.    The appendix was placed into an endocatch bag and removed from the 12mm port. Turbid fluid in the pelvis was then suctioned. Hemostasis was confirmed. The 12mm incision was closed using 0 Vicryl suture.    All skin incisions were closed using 4-0 monocryl suture.    Dr. Silverman was present for all critical components of the operation, and all needle and sponge counts were correct x2 at the end of the procedure.    Dwayne Hastings, PGY8  [phone]      Attending attestation:  I was present for entirety of procedure up to and including closure of fascia.

## 2018-11-13 NOTE — DISCHARGE SUMMARY
GENERAL SURGERY DISCHARGE SUMMARY  Patient Name: Poppy Bustillo  MR#: [id number]  Date of Admission: 11/12/2018 11:23 AM  Date of Discharge: 11/13/2018  Operating Physician: Dr. Silverman  Discharging Physician: Dr. Mohamud    Discharge Diagnoses:  1. Acute post-operative pain    2. Acute appendicitis, unspecified acute appendicitis type        Procedures Performed this admission:  Procedure(s):  LAPAROSCOPIC APPENDECTOMY    Consultations:  None    Brief HPI:  Poppy Bustillo is a 33 year old year old female no significant past medical history that presented after 1 day of epigastric pain that was constant and worsening with lying flat or after she has something to eat. Labs were obtained, CBC, CMP, and lipase were all WNL. CT scan demonstrated a dilated appendix with fatty infiltration with a large appendicolith. Patient was given a dose of IV ertapenem, IVF, analgesics, antiemetics, and a GI cocktail. Patient was transferred to [facility] for surgical intervention.     Patient states that her abdominal pain started yesterday in the epigastric region and was constant. She also endorsed multiple episodes of non-bloody emesis. Pain is exacerbated by movement, laying flat, and consuming food. Patient has not had anything to eat or drink since 11/11/18. Denies any history of abdominal surgery. Having normal bowel movements. Patient did not have any questions or concerns regarding the procedure and understood the risks and benefits of surgical intervention.      Hospital Course:   Poppy Bustillo was admitted s/p the aforementioned procedure which the patient tolerated well without complications. The patient was transferred to the general floor for postoperative recovery. Cardiopulmonary and renal status remained stable throughout the admission. Diet was advanced and patient achieved full return of bowel function. The post-operative course was uneventful.    On day of discharge, she was tolerating a regular diet,  ambulating, voiding spontaneously without difficulty, and pain was controlled with oral pain medications. The patient was discharged home in stable and improved condition. She will follow up in clinic in 1-2 weeks.    Pathology:    ID Type Source Tests Collected by Time Destination   A : Appendix  Tissue Appendix SURGICAL PATHOLOGY EXAM Lynnette Silverman MD 11/13/2018 12:48 AM        Discharge Exam:  /46 (BP Location: Right arm)  Pulse 85  Temp 98.2  F (36.8  C) (Oral)  Resp 16  Wt 72.3 kg (159 lb 8 oz)  LMP 11/04/2018  SpO2 97%  General: Alert, in no acute distress.   HEENT: Normocephalic, atraumatic.   Respiratory: Breathing comfortably.  Cardiovascular: Regular rate and rhythm.   Abdomen soft, non-distended, appropriately tender, no rebound or guarding; midline incision is c/d/i  Extremities: no edema, wwp    Medications on Discharge:      Review of your medicines      START taking       Dose / Directions    acetaminophen 325 MG tablet   Commonly known as:  TYLENOL   Used for:  Acute post-operative pain        Dose:  650 mg   Take 2 tablets (650 mg) by mouth every 6 hours as needed for mild pain   Quantity:  100 tablet   Refills:  0       oxyCODONE IR 5 MG tablet   Commonly known as:  ROXICODONE   Used for:  Acute appendicitis, unspecified acute appendicitis type        Dose:  5-10 mg   Take 1-2 tablets (5-10 mg) by mouth every 6 hours as needed (Moderate to Severe Pain)   Quantity:  16 tablet   Refills:  0            Where to get your medicines      These medications were sent to [facility] - [place], [place] - [address]  [address], [facility] [address]     Phone:  [phone]      acetaminophen 325 MG tablet         Some of these will need a paper prescription and others can be bought over the counter. Ask your nurse if you have questions.     Bring a paper prescription for each of these medications      oxyCODONE IR 5 MG tablet             Discharge  Procedure Orders  Discharge Instructions   Order Comments: May start general diet immediately.    Do not lift more than 20 pounds for 4 weeks after surgery.     You may shower after surgery but do not scrub incisions; simply let soapy water run over them. Pat area dry.     If you have steri-strips or glue over the incisions, these items will fall off on their own and do not need to be replaced.    Do not soak in a bath tub or pool for 4 weeks after surgery.    No driving, no using heavy machinery and no major decision-making while taking narcotic pain medication. It is illegal to drive while taking narcotic pain medication. Narcotics can cause constipation. Take stool softener while taking narcotic pain medication. If no bowel movement for 2 days add a laxative to aid in bowel movement. You can obtain a laxative from your pharmacyst over the counter.     You may take Tylenol (acetaminophen) and/or Motrin (ibuprofen) in addition or instead of narcotic medication; it is helpful to take these medications as you wean down off of the narcotic medications. If you have been prescribed Percocet, be aware that it contains Tylenol and oxycodone together. Do not take a total of more than 3500 mg of Tylenol per 24 hours to avoid liver damage.    Please call if you experience increasing abdominal pain, nausea, vomiting, increasing drainage from your wounds, chills, or fever >101.5.    If you have questions about your follow-up appointment, please call the General Surgery Clinic at [phone].  Call [phone] and ask to speak with surgery resident if you are having troubles in the evenings, at night, or on weekends.     Reason for your hospital stay   Order Comments: Acute appendicitis     Activity   Order Comments: Your activity upon discharge: no heavy lifting for 2-3 weeks, do not drive while taking narcotic pain medications, activity as tolerated.   Order Specific Question Answer Comments   Is discharge order? Yes       Full Code   Order Specific Question Answer Comments   Code status determined by: Discussion with patient/legal decision maker      Diet   Order Comments: Follow this diet upon discharge: Regular   Order Specific Question Answer Comments   Is discharge order? Yes        Seen and discussed with chief resident, Dr. Tello who will discuss with staff, Dr. Mohamud.      Jaxson Jordan MD  General Surgery Resident PGY-1  Pager [phone]

## 2018-11-13 NOTE — ED NOTES
[facility], [place]   ED Nurse to Floor Handoff     Poppy Bustillo is a 33 year old female who speaks [place] and lives with family members,  in a home  They arrived in the ED by car from home    ED Chief Complaint: Abdominal Pain (epigastric pain since yesterday, emesis x 2 today)    ED Dx;   Final diagnoses:   Acute appendicitis, unspecified acute appendicitis type         Needed?: Yes    Allergies: No Known Allergies.  Past Medical Hx:   Past Medical History:   Diagnosis Date     NO ACTIVE PROBLEMS       Baseline Mental status: WDL  Current Mental Status changes: at basesline    Infection present or suspected this encounter: no  Sepsis suspected: No  Isolation type: No active isolations     Activity level - Baseline/Home:  Independent  Activity Level - Current:   Independent    Bariatric equipment needed?: No    In the ED these meds were given:   Medications   0.9% sodium chloride BOLUS (0 mLs Intravenous Stopped 11/12/18 1242)     Followed by   sodium chloride 0.9% infusion (1,000 mLs Intravenous New Bag 11/12/18 1808)   0.9% sodium chloride BOLUS (0 mLs Intravenous Stopped 11/12/18 1530)     Followed by   sodium chloride 0.9% infusion (not administered)   sodium chloride 0.9 % bag 500mL for CT scan flush use (59 mLs Intravenous Given 11/12/18 1627)   ondansetron (ZOFRAN) injection 4 mg (not administered)   ondansetron (ZOFRAN) injection 4 mg (4 mg Intravenous Given 11/12/18 1209)   lidocaine (viscous) (XYLOCAINE) 2 % 15 mL, alum & mag hydroxide-simethicone (MYLANTA ES/MAALOX  ES) 15 mL GI Cocktail (30 mLs Oral Given 11/12/18 1219)   famotidine (PEPCID) injection 20 mg (20 mg Intravenous Given 11/12/18 1213)   ondansetron (ZOFRAN) injection 4 mg (4 mg Intravenous Given 11/12/18 1424)   HYDROmorphone (PF) (DILAUDID) injection 0.5 mg (0.5 mg Intravenous Given 11/12/18 1420)   iopamidol (ISOVUE-370) solution 100 mL (78 mLs Intravenous Given 11/12/18 1626)   ertapenem  (INVanz) 1 g vial to attach to  mL bag (1 g Intravenous New Bag 11/12/18 9320)   HYDROmorphone (PF) (DILAUDID) injection 0.5 mg (0.5 mg Intravenous Given 11/12/18 2500)       Drips running?  Yes    Home pump  No    Current LDAs  Peripheral IV 11/12/18 Left (Active)   Site Assessment WDL 11/12/2018 12:08 PM   Line Status Infusing 11/12/2018 12:08 PM   Number of days:0       Labs results:   Labs Ordered and Resulted from Time of ED Arrival Up to the Time of Departure from the ED   COMPREHENSIVE METABOLIC PANEL - Abnormal; Notable for the following:        Result Value    Glucose 100 (*)     All other components within normal limits   LIPASE - Abnormal; Notable for the following:     Lipase 50 (*)     All other components within normal limits   HCG QUAL URINE POCT - Normal   CBC WITH PLATELETS DIFFERENTIAL       Imaging Studies:   Recent Results (from the past 24 hour(s))   US Abdomen Limited (RUQ)    Narrative    RIGHT UPPER QUADRANT ULTRASOUND    PROCEDURE DATE:  11/12/2018 1:34 PM     CLINICAL HISTORY/INDICATION:   epigastric pain and vomiting;     COMPARISON:   None.    TECHNIQUE:   Grayscale and color doppler were performed of the right upper  quadrant.    FINDINGS:   Gallbladder:  Normal with no cholelithiasis, wall thickening or focal  tenderness.      Bile ducts:   CHD is normal diameter.  No intrahepatic biliary  dilatation.    Liver:   Normal.     Pancreas:  Unremarkable head and body, the tail is partially obscured  by bowel gas.    Right kidney:  Measures 9.5 cm. Cortical echogenicity is normal without evidence for  shadowing stone or mass. No renal cysts. No hydronephrosis.       Impression    IMPRESSION:   Unremarkable right upper quadrant ultrasound    KWABENA GERARDO MD   CT Abdomen Pelvis w Contrast    Narrative    CT ABDOMEN AND PELVIS WITH CONTRAST   11/12/2018 4:28 PM     HISTORY: Abdominal pain, intractable with vomiting.    TECHNIQUE: 78mL of isovue 370. CT images of the abdomen and  pelvis  following nonionic intravenous contrast. Radiation dose for this scan  was reduced using automated exposure control, adjustment of the mA  and/or kV according to patient size, or iterative reconstruction  technique.    COMPARISON: None.    FINDINGS:   The appendix is abnormally dilated at 1.6 cm and surrounded by fatty  infiltration. There is a large appendicolith. There is some  fecalization of the distal small bowel contents but no evidence of  abscess. There is trace free fluid in the pelvis.    The liver, gallbladder, spleen, pancreas, adrenal glands, and kidneys  appear normal. No free intraperitoneal air. No bowel obstruction. No  abdominal or retroperitoneal lymphadenopathy. No pelvic adenopathy.  Visualized bones are unremarkable.      Impression    IMPRESSION: Acute appendicitis.    Results discussed with Dr. Gongora at 4:38 PM on 11/12/2018.    LISHA NAM MD       Recent vital signs:   /82  Pulse 85  Temp 98.1  F (36.7  C) (Oral)  Resp 16  Wt 72.3 kg (159 lb 8 oz)  LMP 11/04/2018  SpO2 99%    Cardiac Rhythm: Normal Sinus  Pt needs tele? No  Skin/wound Issues: None    Code Status: Full Code    Pain control: good    Nausea control: good    Abnormal labs/tests/findings requiring intervention: see results      Family present during ED course? Yes   Family Comments/Social Situation comments: none    Tasks needing completion: None    Sadie Shah RN  [facility]--    [phone] [place] [facility]  [phone] [facility]

## 2018-11-13 NOTE — PROGRESS NOTES
Post-Operative check: 4:35 AM 11/13/2018   Name: Poppy Bustillo 33 year old female ID: [id number]      POD#0 s/p laparoscopic appendetomy      S: History was taken with the aid of her . Pt is alert and oriented, pt states that pain is well controlled. No nausea/vomiting/chest pain/shortness of breath. No complaints at this time,    O:  /64  Pulse 85  Temp 97.4  F (36.3  C) (Oral)  Resp 17  Wt 72.3 kg (159 lb 8 oz)  LMP 11/04/2018  SpO2 98%  GEN: female, appropriate responses to questions, NAD  CV/PULM: RRR. No M/G/R. Lungs CTAB on anterior exam.   Abdomen: Soft, appropriately tender.    Incision: Dressings in place with no serosanguinous staining    Labs:   Lab Results   Component Value Date    WBC 8.8 11/12/2018     Lab Results   Component Value Date    RBC 4.63 11/12/2018     Lab Results   Component Value Date    HGB 14.2 11/12/2018     Lab Results   Component Value Date    HCT 41.7 11/12/2018     Lab Results   Component Value Date    MCV 90 11/12/2018     Lab Results   Component Value Date    MCH 30.7 11/12/2018     Lab Results   Component Value Date    MCHC 34.1 11/12/2018     Lab Results   Component Value Date    RDW 12.2 11/12/2018     Lab Results   Component Value Date     11/12/2018     Last Comprehensive Metabolic Panel:  Sodium   Date Value Ref Range Status   11/12/2018 140 133 - 144 mmol/L Final     Potassium   Date Value Ref Range Status   11/12/2018 3.4 3.4 - 5.3 mmol/L Final     Chloride   Date Value Ref Range Status   11/12/2018 105 94 - 109 mmol/L Final     Carbon Dioxide   Date Value Ref Range Status   11/12/2018 27 20 - 32 mmol/L Final     Anion Gap   Date Value Ref Range Status   11/12/2018 8 3 - 14 mmol/L Final     Glucose   Date Value Ref Range Status   11/12/2018 100 (H) 70 - 99 mg/dL Final     Urea Nitrogen   Date Value Ref Range Status   11/12/2018 9 7 - 30 mg/dL Final     Creatinine   Date Value Ref Range Status   11/12/2018 0.67 0.52 - 1.04 mg/dL Final     GFR  Estimate   Date Value Ref Range Status   11/12/2018 >90 >60 mL/min/1.7m2 Final     Comment:     Non  GFR Calc     Calcium   Date Value Ref Range Status   11/12/2018 9.2 8.5 - 10.1 mg/dL Final       A/P: 33 year old female who is s/p laparoscopic appendectomy. Recovering well following surgery.       Further management per primary team.     Derek Wills MD on 11/13/2018 at 4:35 AM  PGY-1, General Surgey

## 2018-11-13 NOTE — PROGRESS NOTES
Focus:  Status  D:   Monitoring status  I:   Vitals taken        C/o slight dizziness and abd pain, dr informed, dr said to continue to monitor        Dr said pt may possibly discharge hm later this evening if feeling better         Got Oxycodone abd pain and Tylenol HA          Gait very unsteady, up with assist of 1 but needs to be in arms reach  A:     Voiding spont'           Will order an  to assist with discharge orders  P:    Denied resp distress/cp nor any signs observed          Report to oncoming nurse

## 2018-11-13 NOTE — ANESTHESIA CARE TRANSFER NOTE
Patient: Poppy Bustillo    Procedure(s):  LAPAROSCOPIC APPENDECTOMY    Diagnosis: acute appendicitis  Diagnosis Additional Information: No value filed.    Anesthesia Type:   General, RSI     Note:  Airway :Nasal Cannula  Patient transferred to:PACU  Comments: Airway :Nasal Cannula  Patient transferred to:PACU  Comments: Prior to extubation, patient was breathing spontaneously with appropriate respiratory rate and tidal volume. The patient was following commands, warm and demonstrated adequate strength. Patient was suctioned and extubated without complication.   Transported to PACU on O2 via nasal cannula   VSS upon arrival to PACU.  Patient denies nausea or pain at this time.   Care transfer plan communicated and patient care transferred to PACU RN     Brayan Trotter  Anesthesiology Resident, PGY-2   [facility]   [phone]  11/13/20181:27 AM  Handoff Report: Identifed the Patient, Identified the Reponsible Provider, Reviewed the pertinent medical history, Discussed the surgical course, Reviewed Intra-OP anesthesia mangement and issues during anesthesia, Set expectations for post-procedure period and Allowed opportunity for questions and acknowledgement of understanding      Vitals: (Last set prior to Anesthesia Care Transfer)    CRNA VITALS  11/13/2018 0046 - 11/13/2018 0127      11/13/2018             Pulse: 120    Ht Rate: 118    SpO2: 100 %    Resp Rate (observed): (!)  7    Resp Rate (set): 10                Electronically Signed By: Brayan Trotter MD  November 13, 2018  1:27 AM

## 2018-11-13 NOTE — H&P
General Surgery Admission History & Physical  Poppy Bustillo MRN# [id number]   YOB: 1985 Age: 33 year old     Date of Admission: 11/12/2018    CC:   Acute appendicits    HPI:  Poppy Bustillo is a 33 year old year old female no significant past medical history that presented to the [facility] ED after 1 day of epigastric pain that was constant and worsening with lying flat or after she has something to eat. Labs were obtained, CBC, CMP, and lipase were all WNL. CT scan demonstrated a dilated appendix with fatty infiltration with a large appendicolith. Patient was given a dose of IV ertapenem, IVF, analgesics, antiemetics, and a GI cocktail. Patient was transferred to [facility] for surgical intervention.    Patient states that her abdominal pain started yesterday in the epigastric region and was constant. She also endorsed multiple episodes of non-bloody emesis. Pain is exacerbated by movement, laying flat, and consuming food. Patient has not had anything to eat or drink since 11/11/18. Denies any history of abdominal surgery. Having normal bowel movements. Patient did not have any questions or concerns regarding the procedure and understood the risks and benefits of surgical intervention.     ROS:  The remainder of the complete ROS was negative unless noted in the HPI.    PMH:  Past Medical History:   Diagnosis Date     NO ACTIVE PROBLEMS        PSH:  History reviewed. No pertinent surgical history.    Allergies:   No Known Allergies    Home Medications    No current facility-administered medications on file prior to encounter.   No current outpatient prescriptions on file prior to encounter.    SH:  Social History   Substance Use Topics     Smoking status: Never Smoker     Smokeless tobacco: Never Used     Alcohol use No       FH:  No family history on file.    Physical Exam:  Temp:  [98.1  F (36.7  C)] 98.1  F (36.7  C)  Pulse:  [85] 85  Resp:  [16] 16  BP: (102-125)/(59-82) 102/59  SpO2:  [94 %-99  %] 99 %     **  General: AAO, NAD, lying in bed, appears uncomfortable  CV: RRR, no murmurs, gallops, or rubs  Pulm: CTAB, breathing comfortably on RA  Abd: soft, diffusely tender to palpation with focal increase at the periumbilical area and epigastrium. No pain upon percussion, rigidity, or guarding.   Extremities: warm, well-perfused without edema      Labs:    Recent Labs  Lab 11/12/18  1211   WBC 8.8   RBC 4.63   HGB 14.2   HCT 41.7   MCV 90   MCH 30.7   MCHC 34.1   RDW 12.2            Recent Labs  Lab 11/12/18  1211      POTASSIUM 3.4   CHLORIDE 105   CO2 27   BUN 9   CR 0.67   *   JOSE 9.2         Recent Labs  Lab 11/12/18  1211   AST 20   ALT 20   ALKPHOS 70   BILITOTAL 0.6   ALBUMIN 4.0       Imaging:  Us Abdomen Limited (ruq)  Result Date: 11/12/2018  RIGHT UPPER QUADRANT ULTRASOUND PROCEDURE DATE: 11/12/2018 1:34 PM CLINICAL HISTORY/INDICATION: epigastric pain and vomiting; COMPARISON: None. TECHNIQUE: Grayscale and color doppler were performed of the right upper quadrant. FINDINGS: Gallbladder:  Normal with no cholelithiasis, wall thickening or focal tenderness.  Bile ducts:   CHD is normal diameter.  No intrahepatic biliary dilatation. Liver:   Normal. Pancreas:  Unremarkable head and body, the tail is partially obscured by bowel gas. Right kidney: Measures 9.5 cm. Cortical echogenicity is normal without evidence for shadowing stone or mass. No renal cysts. No hydronephrosis.   IMPRESSION: Unremarkable right upper quadrant ultrasound KWABENA GERARDO MD    Ct Abdomen Pelvis W Contrast  Result Date: 11/12/2018  CT ABDOMEN AND PELVIS WITH CONTRAST   11/12/2018 4:28 PM HISTORY: Abdominal pain, intractable with vomiting. TECHNIQUE: 78mL of isovue 370. CT images of the abdomen and pelvis following nonionic intravenous contrast. Radiation dose for this scan was reduced using automated exposure control, adjustment of the mA and/or kV according to patient size, or iterative reconstruction  technique. COMPARISON: None. FINDINGS: The appendix is abnormally dilated at 1.6 cm and surrounded by fatty infiltration. There is a large appendicolith. There is some fecalization of the distal small bowel contents but no evidence of abscess. There is trace free fluid in the pelvis. The liver, gallbladder, spleen, pancreas, adrenal glands, and kidneys appear normal. No free intraperitoneal air. No bowel obstruction. No abdominal or retroperitoneal lymphadenopathy. No pelvic adenopathy. Visualized bones are unremarkable.   IMPRESSION: Acute appendicitis.     Assessment:  Poppy Bustillo is a 33 year old healthy female is presenting with acute appendicits.    Plan:  - Admit to general surgery under Dr. Silverman  - NPO  - Plan for urgent laparoscopic appendectomy 11/12/18    Patient findings and plan discussed with the chief resident Dr. Marx and staff Dr. Silverman.      Jaxson Jordan MD  General Surgery (PGY-1)  [phone] (pager)

## 2018-11-13 NOTE — PLAN OF CARE
Problem: Patient Care Overview  Goal: Plan of Care/Patient Progress Review  Outcome: No Change  Pt arrived from [facility] ED. Pt was nauseous w/ lower r abd pain. Pt vomited once w/ IV dilaudid and Zofran given. LR running @ 100 mL/hr. Report given to OR, admission questions completed w/ help of family bedside. Pt left for OR. Continue plan of care.

## 2018-11-13 NOTE — ANESTHESIA POSTPROCEDURE EVALUATION
Anesthesia POST Procedure Evaluation    Patient: Poppy Bustillo   MRN:     [id number] Gender:   female   Age:    33 year old :      1985        Preoperative Diagnosis: acute appendicitis   Procedure(s):  LAPAROSCOPIC APPENDECTOMY   Postop Comments: No value filed.       Anesthesia Type:  General    Reportable Event: NO     PAIN: Uncomplicated   Sign Out status: Comfortable, Well controlled pain     PONV: No PONV   Sign Out status:  No Nausea or Vomiting     Neuro/Psych: Uneventful perioperative course   Sign Out Status: Preoperative baseline; Age appropriate mentation     Airway/Resp.: Uneventful perioperative course   Sign Out Status: Non labored breathing, age appropriate RR; Resp. Status within EXPECTED Parameters     CV: Uneventful perioperative course   Sign Out status: Appropriate BP and perfusion indices; Appropriate HR/Rhythm     Disposition:   Sign Out in:  PACU  Disposition:  Floor  Recovery Course: Uneventful  Follow-Up: Not required           Last Anesthesia Record Vitals:  CRNA VITALS  2018 0046 - 2018 0131      2018             Pulse: 120    Ht Rate: 118    SpO2: 100 %    Resp Rate (observed): (!)  7    Resp Rate (set): 10          Last PACU/Preop Vitals:  Vitals:    18 1912 18 1913 18 2215   BP: 102/59  112/70   Pulse:      Resp:   18   Temp:   36.8  C (98.2  F)   SpO2:  99% 100%         Electronically Signed By: Yvonne Horvath MD, 2018, 1:31 AM

## 2018-11-13 NOTE — PLAN OF CARE
Problem: Surgery Nonspecified (Adult)  Goal: Signs and Symptoms of Listed Potential Problems Will be Absent, Minimized or Managed (Surgery Nonspecified)  Signs and symptoms of listed potential problems will be absent, minimized or managed by discharge/transition of care (reference Surgery Nonspecified (Adult) CPG).   Outcome: No Change  Vital signs:  Temp: 97.4  F (36.3  C) Temp src: Oral BP: 106/64 Pulse: 85 Heart Rate: 77 Resp: 17 SpO2: 98 % O2 Device: Nasal cannula Oxygen Delivery: 2 LPM   POD#0 s/p laparoscopic appendectomy  Pt arrived to 7B from PACU around 0240. A&Ox4, slightly lethargic upon arrival, speaks [place]. AVSS. Pain well managed overnight with PRN dilaudid IV x1, oxycodone 10 mg x1, and tylenol x1. Pt reported slight nausea, Zofran prn x1 with relief. On CLD. LR infusing at 100 ml/hr. Up ambulated to BR w/SBA. Voided good amount, no BM. Lap sites covered with primapore dressing, umbilicus lap site had scant sanguinous drainage when pt was up ambulating to BR, dressing reinforced. On capnography monitoring. Spouse is very supportive and at bedside. Plan for possible discharge today. Continue POC.     Observation Goals:   - Pain well controlled with PO medication: Goal met: Pain well managed overnight with oral oxycodone & tylenol.    - Tolerating clear liquid diet: Goal partially met: Tolerating sips of water & ice chips, pt will order CLD in the morning.

## 2018-11-14 NOTE — PLAN OF CARE
Problem: Patient Care Overview  Goal: Discharge Needs Assessment  Patient reported pain relief with Oxycodone 5 mg.Family at bedside.Tolerated fluids without nausea,intake food approx. 5 bites.Noted dressing bandage with serosanguinous saturated,steri strip in tacked,reinforce,new bandaid applied. Reviewed discharge instructions with interpretor,as no further questions.Ready for discharge.

## 2018-11-15 ENCOUNTER — PATIENT OUTREACH (OUTPATIENT)
Dept: SURGERY | Facility: CLINIC | Age: 33
End: 2018-11-15

## 2018-11-15 LAB — COPATH REPORT: NORMAL

## 2018-11-15 NOTE — PROGRESS NOTES
Poppy Bustillo is a patient of Dr. Lynnette Silverman that underwent a Laparoscopic appendectomy approximately 2 days ago.  Attempted to contact patient via telephone for a status update and review post op teaching.  LM on VM to call office.  Await return call.      Of note:  Pathology:  Pending  Wound:  Steri-strips?  Follow-up:  Routine  Restrictions:  - No lifting, pushing, pulling more than 15-20 pounds for 3-4 weeks  New medications:  Oxycodone, tylenol

## 2018-11-15 NOTE — LETTER
Patient:  Poppy Bustillo  :   1985  MRN:     [id number]        Ms.Sadia Lauri Bustillo  [address]   [facility] [address]        2018    Dear ,    We are writing to inform you of your pathology results from your recent appendix surgery. The results show: Acute appendicitis with serositis. This means you had an inflamed appendix and the surround tissue around the appendix was also inflamed. This is a normal finding, and no follow up is needed based on this pathology.    For any further questions or concerns, please contact the General Surgery Clinic at [phone].    Sincerely,        Jumana Ugarte MD                        Patient Name: POPPY BUSTILLO   MR#: [id number]   Specimen #: [id number]   Collected: 2018   Received: 2018   Reported: 11/15/2018 16:33   Ordering Phy(s): JUMANA UGARTE     For improved result formatting, select 'View Enhanced Report Format' under    Linked Documents section.     SPECIMEN(S):   Appendix     FINAL DIAGNOSIS:   APPENDIX, APPENDECTOMY:   - Acute appendicitis with serositis     I have personally reviewed all specimens and/or slides, including the   listed special stains, and used them   with my medical judgement to determine or confirm the final diagnosis.     Electronically signed out by:     Silvia Avendano M.D., [employer]

## 2018-12-28 ENCOUNTER — OFFICE VISIT (OUTPATIENT)
Dept: SURGERY | Facility: CLINIC | Age: 33
End: 2018-12-28
Payer: COMMERCIAL

## 2018-12-28 VITALS
HEART RATE: 67 BPM | TEMPERATURE: 97.7 F | OXYGEN SATURATION: 99 % | SYSTOLIC BLOOD PRESSURE: 109 MMHG | HEIGHT: 60 IN | DIASTOLIC BLOOD PRESSURE: 67 MMHG | WEIGHT: 165.5 LBS | BODY MASS INDEX: 32.49 KG/M2

## 2018-12-28 DIAGNOSIS — Z09 FOLLOW-UP EXAMINATION FOLLOWING SURGERY: Primary | ICD-10-CM

## 2018-12-28 ASSESSMENT — MIFFLIN-ST. JEOR: SCORE: 1377.2

## 2018-12-28 ASSESSMENT — PAIN SCALES - GENERAL: PAINLEVEL: NO PAIN (0)

## 2018-12-28 NOTE — PATIENT INSTRUCTIONS
You met with Dr. Lynnette Silverman.      Today's visit instructions:    Return to the Surgery Clinic on an as needed basis.        If you have questions please contact Kenrick RN or Emily RN during regular clinic hours, Monday through Friday 7:30 AM - 4:00 PM, or you can contact us via Net Transmit & Receive at anytime.       If you have urgent needs after-hours, weekends, or holidays please call the hospital at [phone] and ask to speak with our on-call General Surgery Team.    Appointment [phone], option #1   Nurse Advice (Kenrick or Emily): [phone]   Surgery Scheduler (Soraya): [phone]  Fax: [phone]

## 2018-12-28 NOTE — LETTER
[facility]  [address]  [address]  [place] [address]          December 28, 2018    RE:  Poppy Bustillo                                                                                                                                                       [address]   [facility] [address]            To whom it may concern:    Poppy Bustillo is under the professional care of Dr. Lynnette Silverman following her surgery on November 12.    Poppy has no lifting or work restrictions at this time and may resume her normal work activities.    Sincerely,        Lynnette Silverman MD

## 2018-12-28 NOTE — PROGRESS NOTES
Patient here for f/u.   present.  Patient doing well overall.  Tolerating diet.  Normal bm.  No fevers/chills.  Has had some pain in her LLQ port incision, a 5 mm incision.  Some minor drainage from it. About a week ago, noticed a small suture end from this incision.   On exam, all incisions healing well.  No sign of infection.  No signs of hernia(patient examined both laying down and upright.).  LLQ wound with small scab that was removed, could have been some remnant of suture.  No drainage noted.    A/p - normal postoperative recovery.  Note given for employer.  No further follow-up needed unless new symptoms arise from the wound.

## 2018-12-28 NOTE — LETTER
12/28/2018       RE: Poppy Bustillo  [address]  [place] [facility] [address]     Dear Colleague,    Thank you for referring your patient, Poppy Bustillo, to the [facility] at [facility]. Please see a copy of my visit note below.    Patient here for f/u.   present.  Patient doing well overall.  Tolerating diet.  Normal bm.  No fevers/chills.  Has had some pain in her LLQ port incision, a 5 mm incision.  Some minor drainage from it. About a week ago, noticed a small suture end from this incision.   On exam, all incisions healing well.  No sign of infection.  No signs of hernia(patient examined both laying down and upright.).  LLQ wound with small scab that was removed, could have been some remnant of suture.  No drainage noted.    A/p - normal postoperative recovery.  Note given for employer.  No further follow-up needed unless new symptoms arise from the wound.      Again, thank you for allowing me to participate in the care of your patient.      Sincerely,    Lynnette Silverman MD

## 2018-12-28 NOTE — LETTER
Date:December 31, 2018      Patient was self referred, no letter generated. Do not send.        [facility] Information

## (undated) DEVICE — SU VICRYL 0 UR-6 27" J603H

## (undated) DEVICE — DRSG STERI STRIP 1/2X4" R1547

## (undated) DEVICE — ESU LIGASURE MARYLAND VESSEL LAP 44CM XLONG LF1944

## (undated) DEVICE — STPL RELOAD REG TISSUE ECHELON 45 X 3.6MM BLUE GST45B

## (undated) DEVICE — ANTIFOG SOLUTION W/FOAM PAD CF-1001

## (undated) DEVICE — PREP CHLORAPREP 26ML TINTED ORANGE  260815

## (undated) DEVICE — ENDO POUCH UNIV RETRIEVAL SYSTEM INZII 10MM CD001

## (undated) DEVICE — DEVICE SUTURE GRASPER TROCAR CLOSURE 14GA PMITCSG

## (undated) DEVICE — Device

## (undated) DEVICE — ENDO TROCAR FIRST ENTRY KII FIOS Z-THRD 12X100MM CTF73

## (undated) DEVICE — ESU GROUND PAD ADULT W/CORD E7507

## (undated) DEVICE — GLOVE PROTEXIS POWDER FREE SMT 7.5  2D72PT75X

## (undated) DEVICE — SU VICRYL 2-0 SH 27" UND J417H

## (undated) DEVICE — LIGHT HANDLE X1 31140133

## (undated) DEVICE — GLOVE PROTEXIS BLUE W/NEU-THERA 8.0  2D73EB80

## (undated) DEVICE — DRSG PRIMAPORE 02X3" 7133

## (undated) DEVICE — ENDO TROCAR FIRST ENTRY KII FIOS Z-THRD 05X100MM CTF03

## (undated) DEVICE — SU VICRYL 4-0 PS-2 18" UND J496H

## (undated) DEVICE — ESU PENCIL W/COATED BLADE E2450H

## (undated) DEVICE — CATH TRAY FOLEY SURESTEP 16FR W/URNE MTR STLK LATEX A303316A

## (undated) DEVICE — LINEN TOWEL PACK X5 5464

## (undated) DEVICE — DECANTER VIAL 2006S

## (undated) DEVICE — ENDO TROCAR SLEEVE KII Z-THREADED 05X100MM CTS02

## (undated) DEVICE — ESU ENDO SCISSORS 5MM CVD 5DCS

## (undated) DEVICE — LINEN TOWEL PACK X6 WHITE 5487

## (undated) DEVICE — NDL INSUFFLATION 13GA 120MM C2201

## (undated) DEVICE — STPL ENDO LINEAR CUT ECHELON FLEX GST 45MM LONG PLEE45A

## (undated) RX ORDER — HYDROMORPHONE HYDROCHLORIDE 1 MG/ML
INJECTION, SOLUTION INTRAMUSCULAR; INTRAVENOUS; SUBCUTANEOUS
Status: DISPENSED
Start: 2018-11-13

## (undated) RX ORDER — PROPOFOL 10 MG/ML
INJECTION, EMULSION INTRAVENOUS
Status: DISPENSED
Start: 2018-11-12

## (undated) RX ORDER — DEXAMETHASONE SODIUM PHOSPHATE 4 MG/ML
INJECTION, SOLUTION INTRA-ARTICULAR; INTRALESIONAL; INTRAMUSCULAR; INTRAVENOUS; SOFT TISSUE
Status: DISPENSED
Start: 2018-11-12

## (undated) RX ORDER — FENTANYL CITRATE 50 UG/ML
INJECTION, SOLUTION INTRAMUSCULAR; INTRAVENOUS
Status: DISPENSED
Start: 2018-11-13

## (undated) RX ORDER — PROPOFOL 10 MG/ML
INJECTION, EMULSION INTRAVENOUS
Status: DISPENSED
Start: 2018-11-13

## (undated) RX ORDER — ONDANSETRON 2 MG/ML
INJECTION INTRAMUSCULAR; INTRAVENOUS
Status: DISPENSED
Start: 2018-11-12

## (undated) RX ORDER — GLYCOPYRROLATE 0.2 MG/ML
INJECTION, SOLUTION INTRAMUSCULAR; INTRAVENOUS
Status: DISPENSED
Start: 2018-11-12

## (undated) RX ORDER — PHENYLEPHRINE HCL IN 0.9% NACL 1 MG/10 ML
SYRINGE (ML) INTRAVENOUS
Status: DISPENSED
Start: 2018-11-12

## (undated) RX ORDER — EPHEDRINE SULFATE 50 MG/ML
INJECTION, SOLUTION INTRAMUSCULAR; INTRAVENOUS; SUBCUTANEOUS
Status: DISPENSED
Start: 2018-11-12

## (undated) RX ORDER — LIDOCAINE HYDROCHLORIDE 20 MG/ML
INJECTION, SOLUTION EPIDURAL; INFILTRATION; INTRACAUDAL; PERINEURAL
Status: DISPENSED
Start: 2018-11-12

## (undated) RX ORDER — LIDOCAINE HYDROCHLORIDE AND EPINEPHRINE 10; 10 MG/ML; UG/ML
INJECTION, SOLUTION INFILTRATION; PERINEURAL
Status: DISPENSED
Start: 2018-11-12

## (undated) RX ORDER — HYDROMORPHONE HCL/0.9% NACL/PF 0.2MG/0.2
SYRINGE (ML) INTRAVENOUS
Status: DISPENSED
Start: 2018-11-13

## (undated) RX ORDER — SODIUM CHLORIDE, SODIUM LACTATE, POTASSIUM CHLORIDE, CALCIUM CHLORIDE 600; 310; 30; 20 MG/100ML; MG/100ML; MG/100ML; MG/100ML
INJECTION, SOLUTION INTRAVENOUS
Status: DISPENSED
Start: 2018-11-13

## (undated) RX ORDER — FENTANYL CITRATE 50 UG/ML
INJECTION, SOLUTION INTRAMUSCULAR; INTRAVENOUS
Status: DISPENSED
Start: 2018-11-12